# Patient Record
Sex: FEMALE | Race: BLACK OR AFRICAN AMERICAN | Employment: OTHER | URBAN - METROPOLITAN AREA
[De-identification: names, ages, dates, MRNs, and addresses within clinical notes are randomized per-mention and may not be internally consistent; named-entity substitution may affect disease eponyms.]

---

## 2017-06-20 ENCOUNTER — OFFICE VISIT (OUTPATIENT)
Dept: CARDIOLOGY CLINIC | Age: 67
End: 2017-06-20

## 2017-06-20 VITALS
BODY MASS INDEX: 20.56 KG/M2 | DIASTOLIC BLOOD PRESSURE: 72 MMHG | WEIGHT: 131 LBS | SYSTOLIC BLOOD PRESSURE: 122 MMHG | HEART RATE: 66 BPM | HEIGHT: 67 IN

## 2017-06-20 DIAGNOSIS — Z72.0 TOBACCO ABUSE: ICD-10-CM

## 2017-06-20 DIAGNOSIS — I10 ESSENTIAL HYPERTENSION: ICD-10-CM

## 2017-06-20 DIAGNOSIS — I65.23 BILATERAL CAROTID ARTERY STENOSIS: ICD-10-CM

## 2017-06-20 DIAGNOSIS — E78.2 MIXED HYPERLIPIDEMIA: ICD-10-CM

## 2017-06-20 DIAGNOSIS — I34.0 NON-RHEUMATIC MITRAL REGURGITATION: ICD-10-CM

## 2017-06-20 DIAGNOSIS — R00.1 BRADYCARDIA: Primary | ICD-10-CM

## 2017-06-20 PROCEDURE — 99214 OFFICE O/P EST MOD 30 MIN: CPT | Performed by: INTERNAL MEDICINE

## 2017-06-20 RX ORDER — AMOXICILLIN 500 MG/1
CAPSULE ORAL
Qty: 4 CAPSULE | Refills: 1 | Status: SHIPPED | OUTPATIENT
Start: 2017-06-20 | End: 2018-07-10

## 2017-07-13 ENCOUNTER — HOSPITAL ENCOUNTER (OUTPATIENT)
Dept: VASCULAR LAB | Age: 67
Discharge: HOME | End: 2017-07-13
Attending: INTERNAL MEDICINE | Admitting: INTERNAL MEDICINE

## 2017-07-13 DIAGNOSIS — I34.0 NONRHEUMATIC MITRAL VALVE INSUFFICIENCY: ICD-10-CM

## 2017-07-13 DIAGNOSIS — I65.23 BILATERAL CAROTID ARTERY STENOSIS: Primary | ICD-10-CM

## 2017-07-13 LAB
LV EF: 55 %
LVEF MODALITY: NORMAL

## 2017-11-07 ENCOUNTER — OFFICE VISIT (OUTPATIENT)
Dept: CARDIOLOGY CLINIC | Age: 67
End: 2017-11-07

## 2017-11-07 VITALS
SYSTOLIC BLOOD PRESSURE: 138 MMHG | HEART RATE: 64 BPM | DIASTOLIC BLOOD PRESSURE: 68 MMHG | WEIGHT: 131 LBS | BODY MASS INDEX: 20.52 KG/M2

## 2017-11-07 DIAGNOSIS — I34.0 NON-RHEUMATIC MITRAL REGURGITATION: ICD-10-CM

## 2017-11-07 DIAGNOSIS — E78.2 MIXED HYPERLIPIDEMIA: ICD-10-CM

## 2017-11-07 DIAGNOSIS — Z72.0 TOBACCO ABUSE: ICD-10-CM

## 2017-11-07 DIAGNOSIS — R00.1 BRADYCARDIA: Primary | ICD-10-CM

## 2017-11-07 PROCEDURE — 99214 OFFICE O/P EST MOD 30 MIN: CPT | Performed by: INTERNAL MEDICINE

## 2017-11-07 PROCEDURE — 93000 ELECTROCARDIOGRAM COMPLETE: CPT | Performed by: INTERNAL MEDICINE

## 2017-11-07 NOTE — PROGRESS NOTES
[facility]   Cardiac Evaluation      Patient: Nicholas Guardado  YOB: 1950  Date: 17       Chief Complaint   hypertension     Referring provider: Vidal Scott MD    History of Present Illness:   Ms Arleen Rockwell is seen today in follow up. She has a long standing history of hypertension, hyperlipidemia, diabetes.  Ms Arleen Rockwell thinks she had a stroke. She was evaluated in the ER and a CT of the head w/o contrast was performed with negative findings. At the time she was dizzy, had memory loss, and felt off balance. States she saw ophthalmologist because of visual field loss and was advised to see cardiologist. She smokes 3 cigarettes per day. She works as a . Collin no longer exercises regularly. Surgeries include . She has 2 children; daughter had diabetes. Her parents are ; mother had diabetes  of a stroke. Her father had AAA and  in surgery. She has a brother with diabetes; 3 siblings . She previously had problems with syncope but is better now that she is off maxide. Past Medical History:   has a past medical history of Diabetes mellitus ([facility] [address] Hyperlipidemia; and Hypertension. 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  atorvastatin (LIPITOR) 10 MG tablet Take 10 mg by mouth daily      aspirin 81 MG tablet Take 81 mg by mouth daily      metFORMIN (GLUCOPHAGE) 500 MG tablet Take 500 mg by mouth 2 times daily (with meals)       No current facility-administered medications for this visit.         Social History:  Social History     Social History    Marital status: Single, cares for boyfriend with Parkinsons     Spouse name: N/A and General Appearance:  appears stated age  Respiratory:  · Normal excursion and expansion without use of accessory muscles  · Resp Auscultation: Normal breath sounds without dullness  Cardiovascular:  · The apical impulses not displaced  · Heart is regular rate and rhythm with normal S1, S2, 3/6 holosystolic murmur at the apex  · The carotid upstroke is normal, no bruit noted   · JVP is not elevated  · Peripheral pulses are symmetrical  · There is no clubbing, cyanosis of the extremities  · No edema  · Femoral Arteries: 2+ and equal  · Pedal Pulses: 2+ and equal   Abdomen:  · No masses or tenderness  · Normal bowel sounds  Neurological/Psychiatric:  · Alert and oriented x3  · Moves all extremities well  · Exhibits normal gait balance and coordination      Assessment/Plan  1. Essential hypertension -stable, with no problems since stopping maxide  GXT 8/16: normal perfusion and HR with excercise is 141.    2 Mixed hyperlipidemia -on Lipitor 10mg daily. Stable, labs 1/17: ; TRIG 113; HDL 44; LDL 63   3 Mitral regurgitation -moderate by echo  Echo 7/20/16: Normal left ventricle size, wall thickness and systolic function with EF 60%. No regional wall motion abnormalities  Heavy mitral annular calcification is present. max mitral valve pressure gradient is 13mmHg and mean pressure gradient is 6 mmHg. mitral valve pressure half-time is calculated at 149 msec. Moderate mitral regurgitation. Mildly dilated left atrium. Aortic valve appears sclerotic but opens adequately. No stenosis. Trivial tricuspid regurgitation with RVSP 39 mmHg. Trivial pulmonic regurgitation   4. Tobacco abuse -discussed smoking cessation at length, smokes 3 cigarettes per day   5. Carotid stenosis -stable  Doppler 7/20/16: 81-70% SHRUTI, 34-99% LICA; advised to stay on lipitor. She has a request by Dr Tisha Balbuena for further testing. 6.    Bradycardia -stable    Ms Shiv Peterson is stable. I discussed smoking cessation with her at length again. Follow up 6 months. Thank you for allowing to me to participate in the care of Timmy.

## 2018-07-10 ENCOUNTER — OFFICE VISIT (OUTPATIENT)
Dept: CARDIOLOGY CLINIC | Age: 68
End: 2018-07-10

## 2018-07-10 VITALS
OXYGEN SATURATION: 96 % | BODY MASS INDEX: 21.66 KG/M2 | WEIGHT: 138 LBS | SYSTOLIC BLOOD PRESSURE: 140 MMHG | HEIGHT: 67 IN | DIASTOLIC BLOOD PRESSURE: 76 MMHG | HEART RATE: 53 BPM

## 2018-07-10 DIAGNOSIS — I34.0 MITRAL VALVE INSUFFICIENCY, UNSPECIFIED ETIOLOGY: ICD-10-CM

## 2018-07-10 DIAGNOSIS — I10 ESSENTIAL HYPERTENSION: ICD-10-CM

## 2018-07-10 DIAGNOSIS — R42 DIZZINESS: ICD-10-CM

## 2018-07-10 DIAGNOSIS — R00.1 BRADYCARDIA: ICD-10-CM

## 2018-07-10 DIAGNOSIS — E78.2 MIXED HYPERLIPIDEMIA: Primary | ICD-10-CM

## 2018-07-10 PROCEDURE — 99214 OFFICE O/P EST MOD 30 MIN: CPT | Performed by: INTERNAL MEDICINE

## 2018-07-10 RX ORDER — LOSARTAN POTASSIUM 25 MG/1
25 TABLET ORAL DAILY
Qty: 90 TABLET | Refills: 3 | Status: SHIPPED | OUTPATIENT
Start: 2018-07-10 | End: 2018-08-07

## 2018-07-10 NOTE — PROGRESS NOTES
[address]   Cardiac Evaluation      Patient: Carolina Lux  YOB: 1950  Date: 7/10/18       Chief Complaint   hypertension     Referring provider: Warren Wei MD    History of Present Illness:   Ms Keegan Gould is seen today in follow up. She has a long standing history of hypertension, hyperlipidemia, diabetes.  Ms Keegan Gould thinks she had a stroke. She was evaluated in the ER and a CT of the head w/o contrast was performed with negative findings. At the time she was dizzy, had memory loss, and felt off balance. States she saw ophthalmologist because of visual field loss and was advised to see cardiologist. She works as a . Surgeries include . She has 2 children; daughter had diabetes. Her parents are ; mother had diabetes  of a stroke. Her father had AAA and  in surgery. She has a brother with diabetes; 3 siblings . Today, Ms Keegan Gould states she has been well. She denies any chest pain, palpitations, ALANIS, dizziness, or edema. She does not exercise as a routine; states she stays active. She does feel \"off balance\" at times. She has stopped smoking 6 months ago after having a cold. Past Medical History:   has a past medical history of Diabetes mellitus ([address] Hyperlipidemia; and Hypertension. 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  atorvastatin (LIPITOR) 10 MG tablet Take 10 mg by mouth daily      aspirin 81 MG tablet Take 81 mg by mouth daily      metFORMIN (GLUCOPHAGE) 500 MG tablet Take 500 mg by mouth 2 times daily (with meals)       No current facility-administered medications for this visit.         Social History:  Social History     Social History    Marital status: Single, cares for boyfriend with Parkinsons lb (59.4 kg)      BP Readings from Last 3 Encounters:   07/10/18 (!) 140/76   11/07/17 138/68   06/20/17 122/72      Constitutional and General Appearance:  appears stated age  Respiratory:  · Normal excursion and expansion without use of accessory muscles  · Resp Auscultation: Normal breath sounds without dullness  Cardiovascular:  · The apical impulses not displaced  · Heart is regular rate and rhythm with normal S1, S2, 3/6 holosystolic murmur at the apex  · The carotid upstroke is normal, no bruit noted   · JVP is not elevated  · Peripheral pulses are symmetrical  · There is no clubbing, cyanosis of the extremities  · No edema  · Femoral Arteries: 2+ and equal  · Pedal Pulses: 2+ and equal   Abdomen:  · No masses or tenderness  · Normal bowel sounds  Neurological/Psychiatric:  · Alert and oriented x3  · Moves all extremities well  · Exhibits normal gait balance and coordination      Assessment/Plan  1. Essential hypertension -stop BB due to bradycardia, change to Losartan 25mg daily. Previously taken off Maxzide   GXT 8/16: normal perfusion and HR with excercise is 141.    2 Mixed hyperlipidemia -on Lipitor 10mg daily. Stable, labs 1/17: ; TRIG 113; HDL 44; LDL 63   3 Mitral regurgitation -moderate by echo  Echo 7/20/16: Normal left ventricle size, wall thickness and systolic function with EF 60%. No regional wall motion abnormalities  Heavy mitral annular calcification is present. max mitral valve pressure gradient is 13mmHg and mean pressure gradient is 6 mmHg. mitral valve pressure half-time is calculated at 149 msec. Moderate mitral regurgitation. Mildly dilated left atrium. Aortic valve appears sclerotic but opens adequately. No stenosis. Trivial tricuspid regurgitation with RVSP 39 mmHg. Trivial pulmonic regurgitation   4. Tobacco abuse -discussed smoking cessation at length, states she quit smoking 6 months ago   5.     Carotid stenosis -repeat  Doppler 7/17: 68-75% SHRUTI, 70-77% LICA; advised to stay on lipitor. 6.    Bradycardia -stable    Echo and carotid doppler. Change BB to Losartan 25mg daily because of bradycardia. . Return in 6 weeks to see CNP    Scribe's attestation: This note was scribed in the presence of Dr Latanya Cunha MD by Yazmin Yates RN. The scribe's documentation has been prepared under my direction and personally reviewed by me in its entirety. I confirm that the note above accurately reflects all work, treatment, procedures, and medical decision making performed by me. Thank you for allowing to me to participate in the care of Timmy.

## 2018-08-01 ENCOUNTER — HOSPITAL ENCOUNTER (OUTPATIENT)
Dept: NON INVASIVE DIAGNOSTICS | Age: 68
Discharge: HOME | End: 2018-08-01
Admitting: INTERNAL MEDICINE

## 2018-08-01 DIAGNOSIS — R42 DIZZINESS AND GIDDINESS: ICD-10-CM

## 2018-08-01 LAB
LEFT VENTRICULAR EJECTION FRACTION HIGH VALUE: 60 %
LEFT VENTRICULAR EJECTION FRACTION MODE: NORMAL
LV EF: 60 %
LVEF MODALITY: NORMAL

## 2018-08-02 ENCOUNTER — TELEPHONE (OUTPATIENT)
Dept: CARDIOLOGY CLINIC | Age: 68
End: 2018-08-02

## 2018-08-02 NOTE — TELEPHONE ENCOUNTER
----- Message from Supa Adrian MD sent at 8/2/2018  9:39 AM EDT -----  Call pt moderate disease bilat

## 2018-08-07 ENCOUNTER — OFFICE VISIT (OUTPATIENT)
Dept: CARDIOLOGY CLINIC | Age: 68
End: 2018-08-07

## 2018-08-07 VITALS
BODY MASS INDEX: 21.82 KG/M2 | WEIGHT: 139 LBS | HEART RATE: 62 BPM | HEIGHT: 67 IN | SYSTOLIC BLOOD PRESSURE: 160 MMHG | DIASTOLIC BLOOD PRESSURE: 80 MMHG

## 2018-08-07 DIAGNOSIS — I65.23 BILATERAL CAROTID ARTERY STENOSIS: ICD-10-CM

## 2018-08-07 DIAGNOSIS — I10 ESSENTIAL HYPERTENSION: ICD-10-CM

## 2018-08-07 DIAGNOSIS — E78.2 MIXED HYPERLIPIDEMIA: Primary | ICD-10-CM

## 2018-08-07 PROCEDURE — 99214 OFFICE O/P EST MOD 30 MIN: CPT | Performed by: NURSE PRACTITIONER

## 2018-08-07 RX ORDER — LOSARTAN POTASSIUM 50 MG/1
50 TABLET ORAL DAILY
Qty: 30 TABLET | Refills: 5 | Status: SHIPPED | OUTPATIENT
Start: 2018-08-07 | End: 2018-09-18 | Stop reason: SDUPTHER

## 2018-08-07 NOTE — LETTER
Site: Left Arm    Position: Sitting    Cuff Size: Medium Adult    Pulse: 62    Weight: 139 lb (63 kg)    Height: 5' 7\" (1.702 m)      Body mass index is 21.77 kg/m². Wt Readings from Last 3 Encounters:   08/06/18 139 lb (63 kg)   07/10/18 138 lb (62.6 kg)   11/07/17 131 lb (59.4 kg)      BP Readings from Last 3 Encounters:   08/07/18 (!) 160/80   07/10/18 (!) 140/76   11/07/17 138/68      Constitutional and General Appearance:  appears stated age  Respiratory:  · Normal excursion and expansion without use of accessory muscles  · Resp Auscultation: Normal breath sounds without dullness  Cardiovascular:  · The apical impulses not displaced  · Heart is regular rate and rhythm with normal S1, S2, 3/6 holosystolic murmur at the apex  · The carotid upstroke is normal, no bruit noted   · JVP is not elevated  · Peripheral pulses are symmetrical  · There is no clubbing, cyanosis of the extremities  · No edema  · Femoral Arteries: 2+ and equal  · Pedal Pulses: 2+ and equal   Abdomen:  · No masses or tenderness  · Normal bowel sounds  Neurological/Psychiatric:  · Alert and oriented x3  · Moves all extremities well  · Exhibits normal gait balance and coordination      Assessment/Plan  1. Essential hypertension -stop BB due to bradycardia,   Increase losartan to 50 mg a day  GXT 8/16: normal perfusion and HR with excercise is 141.    2 Mixed hyperlipidemia -on Lipitor 10mg daily. Stable, labs 1/17: ; TRIG 113; HDL 44; LDL 63   3 Mitral regurgitation -moderate by echo, no change on echo 7/18  Echo 7/20/16: Normal left ventricle size, wall thickness and systolic function with EF 60%. No regional wall motion abnormalities  Heavy mitral annular calcification is present. max mitral valve pressure gradient is 13mmHg and mean pressure gradient is 6 mmHg. mitral valve pressure half-time is calculated at 149 msec. Moderate mitral regurgitation. Mildly dilated left atrium. Aortic valve appears sclerotic but opens adequately.

## 2018-08-08 NOTE — COMMUNICATION BODY
age  Respiratory:  · Normal excursion and expansion without use of accessory muscles  · Resp Auscultation: Normal breath sounds without dullness  Cardiovascular:  · The apical impulses not displaced  · Heart is regular rate and rhythm with normal S1, S2, 3/6 holosystolic murmur at the apex  · The carotid upstroke is normal, no bruit noted   · JVP is not elevated  · Peripheral pulses are symmetrical  · There is no clubbing, cyanosis of the extremities  · No edema  · Femoral Arteries: 2+ and equal  · Pedal Pulses: 2+ and equal   Abdomen:  · No masses or tenderness  · Normal bowel sounds  Neurological/Psychiatric:  · Alert and oriented x3  · Moves all extremities well  · Exhibits normal gait balance and coordination      Assessment/Plan  1. Essential hypertension -stop BB due to bradycardia,   Increase losartan to 50 mg a day  GXT 8/16: normal perfusion and HR with excercise is 141.    2 Mixed hyperlipidemia -on Lipitor 10mg daily. Stable, labs 1/17: ; TRIG 113; HDL 44; LDL 63   3 Mitral regurgitation -moderate by echo, no change on echo 7/18  Echo 7/20/16: Normal left ventricle size, wall thickness and systolic function with EF 60%. No regional wall motion abnormalities  Heavy mitral annular calcification is present. max mitral valve pressure gradient is 13mmHg and mean pressure gradient is 6 mmHg. mitral valve pressure half-time is calculated at 149 msec. Moderate mitral regurgitation. Mildly dilated left atrium. Aortic valve appears sclerotic but opens adequately. No stenosis. Trivial tricuspid regurgitation with RVSP 39 mmHg. Trivial pulmonic regurgitation   4. Tobacco abuse   Not smoking now! .-discussed smoking cessation at length, states she quit smoking 6 months ago   5. Carotid stenosis -repeat  Doppler 7/17: 33-24% SHRUTI, 23-91% LICA; advised to stay on lipitor.     7/18       There is 50-69% stenosis of the bilateral internal carotid arteries.    There is more than 50 % stenosis of the right external

## 2018-09-16 LAB
ALT SERPL-CCNC: 14 U/L (ref 6–29)
AST SERPL-CCNC: 15 U/L (ref 10–35)
BUN / CREAT RATIO: ABNORMAL (CALC) (ref 6–22)
BUN BLDV-MCNC: 19 MG/DL (ref 7–25)
CALCIUM SERPL-MCNC: 9.7 MG/DL (ref 8.6–10.4)
CHLORIDE BLD-SCNC: 107 MMOL/L (ref 98–110)
CHOLESTEROL, TOTAL: 152 MG/DL
CHOLESTEROL/HDL RATIO: 2.9 (CALC)
CHOLESTEROL: 100 MG/DL (CALC)
CO2: 28 MMOL/L (ref 20–32)
CREAT SERPL-MCNC: 0.88 MG/DL (ref 0.5–0.99)
GFR AFRICAN AMERICAN: 79 ML/MIN/1.73M2
GFR SERPL CREATININE-BSD FRML MDRD: 68 ML/MIN/1.73M2
GLUCOSE BLD-MCNC: 122 MG/DL (ref 65–99)
HDLC SERPL-MCNC: 52 MG/DL
LDL CHOLESTEROL CALCULATED: 81 MG/DL (CALC)
POTASSIUM SERPL-SCNC: 5 MMOL/L (ref 3.5–5.3)
SODIUM BLD-SCNC: 146 MMOL/L (ref 135–146)
TRIGL SERPL-MCNC: 99 MG/DL

## 2018-09-18 ENCOUNTER — OFFICE VISIT (OUTPATIENT)
Dept: CARDIOLOGY CLINIC | Age: 68
End: 2018-09-18

## 2018-09-18 VITALS
SYSTOLIC BLOOD PRESSURE: 130 MMHG | HEART RATE: 64 BPM | HEIGHT: 67 IN | DIASTOLIC BLOOD PRESSURE: 82 MMHG | WEIGHT: 140.4 LBS | BODY MASS INDEX: 22.03 KG/M2

## 2018-09-18 DIAGNOSIS — I10 ESSENTIAL HYPERTENSION: ICD-10-CM

## 2018-09-18 DIAGNOSIS — Z72.0 TOBACCO ABUSE: Primary | ICD-10-CM

## 2018-09-18 DIAGNOSIS — E78.2 MIXED HYPERLIPIDEMIA: ICD-10-CM

## 2018-09-18 PROCEDURE — 99213 OFFICE O/P EST LOW 20 MIN: CPT | Performed by: NURSE PRACTITIONER

## 2018-09-18 RX ORDER — LOSARTAN POTASSIUM 50 MG/1
50 TABLET ORAL DAILY
Qty: 90 TABLET | Refills: 3 | Status: SHIPPED | OUTPATIENT
Start: 2018-09-18 | End: 2019-03-19 | Stop reason: SINTOL

## 2018-09-18 NOTE — PROGRESS NOTES
[address]   Cardiac Evaluation      Patient: Rohit Prince  YOB: 1950  Date: 9/18/18       Chief Complaint   hypertension     Referring provider: Wayne Garduno MD    History of Present Illness:   Ms Rosa Pearson is seen today in follow up. Last OV losartan was increased due to elevated  BP  And she is tolerating well. She has a long standing history of hypertension, hyperlipidemia, diabetes. January, 2016 Ms Rosa Pearson thinks she had a stroke. She was evaluated in the ER and a CT of the head w/o contrast was performed with negative findings. At the time she, had memory loss, and felt off balance. States she saw ophthalmologist because of visual field loss and was advised to see cardiologist. She works as a . Today, Ms Rosa Pearson states she has been well. She denies any chest pain, palpitations, ALANIS, dizziness, or edema. She does not exercise as a routine; states she stays active. She does feel \"off balance\" at times. She has stopped smoking 6 months ago after having a cold. She is tolerating losartan, she does not take her BP at home    Past Medical History:   has a past medical history of Diabetes mellitus ([address] Hyperlipidemia; and Hypertension. 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         Social History:  Social History     Social History    Marital status: Single, cares for boyfriend with Parkinsons     Spouse name: N/A    Number of children: N/A    Years of education: N/A     Occupational History external carotid artery .    Vertebral flow are antegrade bilaterally       6. Bradycardia -, improved now of tenormin    PLAN:  Needs BMP   No change in meds        Thank you for allowing to me to participate in the care of Timmy.

## 2018-11-12 ENCOUNTER — HOSPITAL ENCOUNTER (OUTPATIENT)
Dept: MAMMOGRAPHY | Age: 68
Discharge: HOME | End: 2018-11-17
Payer: MEDICARE

## 2018-11-12 DIAGNOSIS — Z12.31 VISIT FOR SCREENING MAMMOGRAM: ICD-10-CM

## 2018-11-12 PROCEDURE — 77067 SCR MAMMO BI INCL CAD: CPT

## 2019-03-18 ENCOUNTER — TELEPHONE (OUTPATIENT)
Dept: CARDIOLOGY CLINIC | Age: 69
End: 2019-03-18

## 2019-03-19 RX ORDER — OLMESARTAN MEDOXOMIL 5 MG/1
5 TABLET ORAL DAILY
Qty: 30 TABLET | Refills: 3 | Status: SHIPPED | OUTPATIENT
Start: 2019-03-19 | End: 2019-06-24 | Stop reason: SDUPTHER

## 2019-04-16 ENCOUNTER — HOSPITAL ENCOUNTER (OUTPATIENT)
Age: 69
Discharge: HOME | End: 2019-04-16
Payer: MEDICARE

## 2019-04-16 ENCOUNTER — OFFICE VISIT (OUTPATIENT)
Dept: CARDIOLOGY CLINIC | Age: 69
End: 2019-04-16
Payer: MEDICARE

## 2019-04-16 VITALS
BODY MASS INDEX: 21.97 KG/M2 | HEART RATE: 105 BPM | OXYGEN SATURATION: 96 % | HEIGHT: 67 IN | DIASTOLIC BLOOD PRESSURE: 86 MMHG | SYSTOLIC BLOOD PRESSURE: 138 MMHG | WEIGHT: 140 LBS

## 2019-04-16 DIAGNOSIS — R00.1 BRADYCARDIA: ICD-10-CM

## 2019-04-16 DIAGNOSIS — R29.898 WEAKNESS OF BOTH LOWER EXTREMITIES: ICD-10-CM

## 2019-04-16 DIAGNOSIS — E78.2 MIXED HYPERLIPIDEMIA: ICD-10-CM

## 2019-04-16 DIAGNOSIS — E11.9 TYPE 2 DIABETES MELLITUS WITHOUT COMPLICATION, WITHOUT LONG-TERM CURRENT USE OF INSULIN (HCC): ICD-10-CM

## 2019-04-16 DIAGNOSIS — I10 ESSENTIAL HYPERTENSION: ICD-10-CM

## 2019-04-16 DIAGNOSIS — Z72.0 TOBACCO ABUSE: ICD-10-CM

## 2019-04-16 DIAGNOSIS — I65.23 BILATERAL CAROTID ARTERY STENOSIS: ICD-10-CM

## 2019-04-16 DIAGNOSIS — I34.0 NON-RHEUMATIC MITRAL REGURGITATION: ICD-10-CM

## 2019-04-16 DIAGNOSIS — I10 ESSENTIAL HYPERTENSION: Primary | ICD-10-CM

## 2019-04-16 LAB
A/G RATIO: 1.7 (ref 1.1–2.2)
ALBUMIN SERPL-MCNC: 4.3 G/DL (ref 3.4–5)
ALP BLD-CCNC: 77 U/L (ref 40–129)
ALT SERPL-CCNC: 10 U/L (ref 10–40)
ANION GAP SERPL CALCULATED.3IONS-SCNC: 13 MMOL/L (ref 3–16)
AST SERPL-CCNC: 13 U/L (ref 15–37)
BASOPHILS ABSOLUTE: 0.1 K/UL (ref 0–0.2)
BASOPHILS RELATIVE PERCENT: 1.3 %
BILIRUB SERPL-MCNC: 0.3 MG/DL (ref 0–1)
BUN BLDV-MCNC: 19 MG/DL (ref 7–20)
CALCIUM SERPL-MCNC: 9.9 MG/DL (ref 8.3–10.6)
CHLORIDE BLD-SCNC: 105 MMOL/L (ref 99–110)
CO2: 25 MMOL/L (ref 21–32)
CREAT SERPL-MCNC: 1 MG/DL (ref 0.6–1.2)
EOSINOPHILS ABSOLUTE: 0.7 K/UL (ref 0–0.6)
EOSINOPHILS RELATIVE PERCENT: 9.8 %
FOLATE: 9.98 NG/ML (ref 4.78–24.2)
GFR AFRICAN AMERICAN: >60
GFR NON-AFRICAN AMERICAN: 55
GLOBULIN: 2.6 G/DL
GLUCOSE BLD-MCNC: 187 MG/DL (ref 70–99)
HCT VFR BLD CALC: 38.9 % (ref 36–48)
HEMOGLOBIN: 12.6 G/DL (ref 12–16)
LYMPHOCYTES ABSOLUTE: 2.3 K/UL (ref 1–5.1)
LYMPHOCYTES RELATIVE PERCENT: 33.3 %
MCH RBC QN AUTO: 31.8 PG (ref 26–34)
MCHC RBC AUTO-ENTMCNC: 32.4 G/DL (ref 31–36)
MCV RBC AUTO: 98.1 FL (ref 80–100)
MONOCYTES ABSOLUTE: 0.6 K/UL (ref 0–1.3)
MONOCYTES RELATIVE PERCENT: 8.9 %
NEUTROPHILS ABSOLUTE: 3.2 K/UL (ref 1.7–7.7)
NEUTROPHILS RELATIVE PERCENT: 46.7 %
PDW BLD-RTO: 14.4 % (ref 12.4–15.4)
PLATELET # BLD: 221 K/UL (ref 135–450)
PMV BLD AUTO: 11.1 FL (ref 5–10.5)
POTASSIUM SERPL-SCNC: 4.9 MMOL/L (ref 3.5–5.1)
RBC # BLD: 3.96 M/UL (ref 4–5.2)
SODIUM BLD-SCNC: 143 MMOL/L (ref 136–145)
T3 TOTAL: 1 NG/ML (ref 0.8–2)
T4 FREE: 1.5 NG/DL (ref 0.9–1.8)
TOTAL PROTEIN: 6.9 G/DL (ref 6.4–8.2)
TSH REFLEX: 0.02 UIU/ML (ref 0.27–4.2)
VITAMIN B-12: 402 PG/ML (ref 211–911)
WBC # BLD: 7 K/UL (ref 4–11)

## 2019-04-16 PROCEDURE — 99214 OFFICE O/P EST MOD 30 MIN: CPT | Performed by: INTERNAL MEDICINE

## 2019-04-16 PROCEDURE — 82746 ASSAY OF FOLIC ACID SERUM: CPT

## 2019-04-16 PROCEDURE — 85025 COMPLETE CBC W/AUTO DIFF WBC: CPT

## 2019-04-16 PROCEDURE — 83036 HEMOGLOBIN GLYCOSYLATED A1C: CPT

## 2019-04-16 PROCEDURE — 84439 ASSAY OF FREE THYROXINE: CPT

## 2019-04-16 PROCEDURE — 93000 ELECTROCARDIOGRAM COMPLETE: CPT | Performed by: INTERNAL MEDICINE

## 2019-04-16 PROCEDURE — 82607 VITAMIN B-12: CPT

## 2019-04-16 PROCEDURE — 84480 ASSAY TRIIODOTHYRONINE (T3): CPT

## 2019-04-16 PROCEDURE — 84443 ASSAY THYROID STIM HORMONE: CPT

## 2019-04-16 PROCEDURE — 36415 COLL VENOUS BLD VENIPUNCTURE: CPT

## 2019-04-16 PROCEDURE — 80053 COMPREHEN METABOLIC PANEL: CPT

## 2019-04-16 NOTE — PROGRESS NOTES
[facility]   Cardiac Evaluation      Patient: Chloé Montero  YOB: 1950  Date: 19       Chief Complaint   hypertension     Referring provider: Clotilde Vogel MD    History of Present Illness:   Ms Shakir Fry is seen today in follow up. She has a long standing history of hypertension, hyperlipidemia, diabetes.  Ms Shakir Fry thinks she had a stroke. She was evaluated in the ER and a CT of the head w/o contrast was performed with negative findings. At the time she was dizzy, had memory loss, and felt off balance. States she saw ophthalmologist because of visual field loss and was advised to see cardiologist. Surgeries include . She has 2 children; daughter had diabetes. Her parents are ; mother had diabetes  of a stroke. Her father had AAA and  in surgery. She has a brother with diabetes; 3 siblings . Today, Ms Shakir Fry states she has been having numbness/tingling in her mouth and weakness in her legs. She states this has been ongoing x2 months. She denies any back pain or falls but has had difficulty with balance. . She denies any chest pain, palpitations, ALANIS, dizziness, or edema. Past Medical History:   has a past medical history of Diabetes mellitus ([address] Hyperlipidemia, and Hypertension. 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         Social History:  Social History     Social History    Marital status: Single, cares for boyfriend with Parkinsons     Spouse name: N/A Encounters:   04/16/19 138/86   09/17/18 130/82   08/07/18 (!) 160/80      Constitutional and General Appearance:  appears stated age  Respiratory:  · Normal excursion and expansion without use of accessory muscles  · Resp Auscultation: Normal breath sounds without dullness  Cardiovascular:  · The apical impulses not displaced  · Heart is regular rate and rhythm with normal S1, S2, 3/6 holosystolic murmur at the apex  · The carotid upstroke is normal, no bruit noted   · JVP is not elevated  · Peripheral pulses are symmetrical  · There is no clubbing, cyanosis of the extremities  · No edema  · Femoral Arteries: 2+ and equal  · Pedal Pulses: 2+ and equal   Abdomen:  · No masses or tenderness  · Normal bowel sounds  Neurological/Psychiatric:  · Alert and oriented x3  · Moves all extremities well  · Exhibits normal gait balance and coordination      Assessment/Plan  1. Essential hypertension -stop BB due to bradycardia,  Previously taken off Maxzide   GXT 8/16: normal perfusion and HR with excercise is 141.    2 Mixed hyperlipidemia -on Lipitor 10mg daily. Stable, labs 1/17: ; TRIG 113; HDL 44; LDL 63   3 Mitral regurgitation -moderate by echo and stable. No indications for surgery at this time. Echo 8/1/18: EF 60%. No regional wall motion abnormalities are seen. Diastolic dysfunction with elevated LV filling pressures. Mitral annular calcification is present. Moderately severe mitral regurgitation is present. There is mild tricuspid regurgitation with RVSP estimated at 28 mmHg  Echo 7/20/16: Normal left ventricle size, wall thickness and systolic function with EF 60%. No regional wall motion abnormalities  Heavy mitral annular calcification is present. max mitral valve pressure gradient is 13mmHg and mean pressure gradient is 6 mmHg. mitral valve pressure half-time is calculated at 149 msec. Moderate mitral regurgitation. Mildly dilated left atrium. Aortic valve appears sclerotic but opens adequately. No stenosis. Trivial tricuspid regurgitation with RVSP 39 mmHg. Trivial pulmonic regurgitation   4. Tobacco abuse -discussed smoking cessation at length, states she quit smoking 2 years ago   5. Carotid stenosis -50-69% bilateral on carotid doppler 8/18  Doppler 7/17: 62-06% SHRUTI, 78-63% LICA; advised to stay on lipitor. 6.    Leg weakness/numbness/tingling -will refer to neurologist    EKG>sinus tachy    PLAN: labs for CBC, CMP, lipids, HGa1C, TSH, T4, B12 and folate. Refer to neuro Fu 6 months. Scribe's attestation: This note was scribed in the presence of Dr Laura Khan MD by Lanny Yin RN. The scribe's documentation has been prepared under my direction and personally reviewed by me in its entirety. I confirm that the note above accurately reflects all work, treatment, procedures, and medical decision making performed by me. Thank you for allowing to me to participate in the care of [email or web address].

## 2019-04-16 NOTE — LETTER
[address] [place] [address] Cardiology [place] [address] [address] [id number]  Phone: [phone]  Fax: [phone]    Faviola Marie MD        2019     Ninoska Del Rio, [address] [facility] [address]    Patient: Darryl Lama  MR Number: [id number]  YOB: 1950  Date of Visit: 2019    Dear Dr. Ninoska Del Rio:        [facility]   Cardiac Evaluation      Patient: Darryl Lama  YOB: 1950  Date: 19       Chief Complaint   hypertension     Referring provider: Ninoska Del Rio MD    History of Present Illness:   Ms Farnaz Rodriguez is seen today in follow up. She has a long standing history of hypertension, hyperlipidemia, diabetes.  Ms Farnaz Rodriguez thinks she had a stroke. She was evaluated in the ER and a CT of the head w/o contrast was performed with negative findings. At the time she was dizzy, had memory loss, and felt off balance. States she saw ophthalmologist because of visual field loss and was advised to see cardiologist. Surgeries include . She has 2 children; daughter had diabetes. Her parents are ; mother had diabetes  of a stroke. Her father had AAA and  in surgery. She has a brother with diabetes; 3 siblings . Today, Ms Farnaz Rodriguez states she has been having numbness/tingling in her mouth and weakness in her legs. She states this has been ongoing x2 months. She denies any back pain or falls but has had difficulty with balance. . She denies any chest pain, palpitations, ALANIS, dizziness, or edema. Past Medical History:   has a past medical history of Diabetes mellitus ([address] Hyperlipidemia, and Hypertension.  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 Social History:  Social History     Social History    Marital status: Single, cares for boyfriend with Parkinsons     Spouse name: N/A    Number of children: N/A    Years of education: N/A     Occupational History          Social History Main Topics    Smoking status: States she quit smoking 2 years ago     Packs/day: 0.50     Types: Cigarettes    Smokeless tobacco: Not on file    Alcohol use No    Drug use: No    Sexual activity: Not on file     Other Topics Concern    Not on file     Social History Narrative       Family History:  family history includes Diabetes in her mother; High Blood Pressure in her mother. Allergies:  Patient has no known allergies. Review of Systems:   · Constitutional: there has been no unanticipated weight loss. No change in energy or activity level   · Eyes: No visual changes   · ENT: No Headaches, hearing loss or vertigo. No mouth sores or sore throat. · Cardiovascular: Reviewed in HPI  · Respiratory: No cough or wheezing, no sputum production. · Gastrointestinal: No abdominal pain, appetite loss, blood in stools. No change in bowel or bladder habits. · Genitourinary: No nocturia, dysuria, trouble voiding  · Musculoskeletal:  Gait disturbance, weakness in her legs  · Integumentary: No rash or pruritis. · Neurological: No headache, change in muscle strength, numbness or tingling. No change in gait, balance, coordination, mood, affect, memory, mentation, behavior. · Psychiatric: No anxiety or depression  · Endocrine: No malaise or fever  · Hematologic/Lymphatic: No abnormal bruising or bleeding, blood clots or swollen lymph nodes. · Allergic/Immunologic: No nasal congestion or hives. Physical Examination:    Vitals:    04/16/19 1137   BP: 138/86   Site: Right Upper Arm   Position: Sitting   Cuff Size: Medium Adult   Pulse: 105   SpO2: 96%   Weight: 140 lb (63.5 kg)   Height: 5' 7\" (1.702 m)     Body mass index is 21.93 kg/m². Wt Readings from Last 3 Encounters:   04/16/19 140 lb (63.5 kg)   09/17/18 140 lb 6.4 oz (63.7 kg)   08/06/18 139 lb (63 kg)      BP Readings from Last 3 Encounters:   04/16/19 138/86   09/17/18 130/82   08/07/18 (!) 160/80      Constitutional and General Appearance:  appears stated age  Respiratory:  · Normal excursion and expansion without use of accessory muscles  · Resp Auscultation: Normal breath sounds without dullness  Cardiovascular:  · The apical impulses not displaced  · Heart is regular rate and rhythm with normal S1, S2, 3/6 holosystolic murmur at the apex  · The carotid upstroke is normal, no bruit noted   · JVP is not elevated  · Peripheral pulses are symmetrical  · There is no clubbing, cyanosis of the extremities  · No edema  · Femoral Arteries: 2+ and equal  · Pedal Pulses: 2+ and equal   Abdomen:  · No masses or tenderness  · Normal bowel sounds  Neurological/Psychiatric:  · Alert and oriented x3  · Moves all extremities well  · Exhibits normal gait balance and coordination      Assessment/Plan  1. Essential hypertension -stop BB due to bradycardia,  Previously taken off Maxzide   GXT 8/16: normal perfusion and HR with excercise is 141.    2 Mixed hyperlipidemia -on Lipitor 10mg daily. Stable, labs 1/17: ; TRIG 113; HDL 44; LDL 63   3 Mitral regurgitation -moderate by echo and stable. No indications for surgery at this time. Echo 8/1/18: EF 60%. No regional wall motion abnormalities are seen. Diastolic dysfunction with elevated LV filling pressures. Mitral annular calcification is present. Moderately severe mitral regurgitation is present. There is mild tricuspid regurgitation with RVSP estimated at 28 mmHg  Echo 7/20/16: Normal left ventricle size, wall thickness and systolic function with EF 60%. No regional wall motion abnormalities  Heavy mitral annular calcification is present. max mitral valve pressure gradient is 13mmHg and mean pressure gradient is 6 mmHg. mitral valve pressure half-time is calculated at 149 msec. Moderate mitral regurgitation. Mildly dilated left atrium. Aortic valve appears sclerotic but opens adequately. No stenosis. Trivial tricuspid regurgitation with RVSP 39 mmHg. Trivial pulmonic regurgitation   4. Tobacco abuse -discussed smoking cessation at length, states she quit smoking 2 years ago   5. Carotid stenosis -50-69% bilateral on carotid doppler 8/18  Doppler 7/17: 37-00% SHRUTI, 88-13% LICA; advised to stay on lipitor. 6.    Leg weakness/numbness/tingling -will refer to neurologist    EKG>sinus tachy    PLAN: labs for CBC, CMP, lipids, HGa1C, TSH, T4, B12 and folate. Refer to neuro Fu 6 months. Scribe's attestation: This note was scribed in the presence of Dr Mohsen Landis MD by Syl Mayers RN. The scribe's documentation has been prepared under my direction and personally reviewed by me in its entirety. I confirm that the note above accurately reflects all work, treatment, procedures, and medical decision making performed by me. Thank you for allowing to me to participate in the care of Timmy. If you have questions, please do not hesitate to call me. I look forward to following Jt Farrell along with you.     Sincerely,        Deepa Frias MD

## 2019-04-17 LAB
ESTIMATED AVERAGE GLUCOSE: 177.2 MG/DL
HBA1C MFR BLD: 7.8 %

## 2019-04-22 ENCOUNTER — TELEPHONE (OUTPATIENT)
Dept: CARDIOLOGY CLINIC | Age: 69
End: 2019-04-22

## 2019-04-22 NOTE — TELEPHONE ENCOUNTER
----- Message from Brendan Patel MD sent at 4/22/2019 11:54 AM EDT -----  Call pt a1c is still a bit high and similar to the previous one.

## 2019-06-17 ENCOUNTER — PROCEDURE VISIT (OUTPATIENT)
Dept: NEUROLOGY | Age: 69
End: 2019-06-17
Payer: MEDICARE

## 2019-06-17 DIAGNOSIS — R29.898 BILATERAL LEG WEAKNESS: Primary | ICD-10-CM

## 2019-06-17 PROCEDURE — 95886 MUSC TEST DONE W/N TEST COMP: CPT | Performed by: PSYCHIATRY & NEUROLOGY

## 2019-06-17 PROCEDURE — 95909 NRV CNDJ TST 5-6 STUDIES: CPT | Performed by: PSYCHIATRY & NEUROLOGY

## 2019-06-24 RX ORDER — OLMESARTAN MEDOXOMIL 5 MG/1
TABLET ORAL
Qty: 30 TABLET | Refills: 2 | Status: SHIPPED | OUTPATIENT
Start: 2019-06-24 | End: 2019-10-22 | Stop reason: SDUPTHER

## 2019-10-22 ENCOUNTER — OFFICE VISIT (OUTPATIENT)
Dept: CARDIOLOGY CLINIC | Age: 69
End: 2019-10-22
Payer: MEDICARE

## 2019-10-22 VITALS
RESPIRATION RATE: 18 BRPM | WEIGHT: 142 LBS | HEART RATE: 78 BPM | SYSTOLIC BLOOD PRESSURE: 169 MMHG | HEIGHT: 67 IN | BODY MASS INDEX: 22.29 KG/M2 | DIASTOLIC BLOOD PRESSURE: 72 MMHG

## 2019-10-22 DIAGNOSIS — I10 ESSENTIAL HYPERTENSION: ICD-10-CM

## 2019-10-22 DIAGNOSIS — I34.0 NON-RHEUMATIC MITRAL REGURGITATION: Primary | ICD-10-CM

## 2019-10-22 DIAGNOSIS — I65.23 BILATERAL CAROTID ARTERY STENOSIS: ICD-10-CM

## 2019-10-22 DIAGNOSIS — E78.5 HYPERLIPIDEMIA, UNSPECIFIED HYPERLIPIDEMIA TYPE: ICD-10-CM

## 2019-10-22 PROCEDURE — 99214 OFFICE O/P EST MOD 30 MIN: CPT | Performed by: INTERNAL MEDICINE

## 2019-10-22 RX ORDER — OLMESARTAN MEDOXOMIL 5 MG/1
TABLET ORAL
Qty: 90 TABLET | Refills: 3 | Status: SHIPPED | OUTPATIENT
Start: 2019-10-22 | End: 2020-09-18 | Stop reason: SDUPTHER

## 2019-10-22 RX ORDER — ATORVASTATIN CALCIUM 10 MG/1
10 TABLET, FILM COATED ORAL DAILY
Qty: 90 TABLET | Refills: 3 | Status: SHIPPED | OUTPATIENT
Start: 2019-10-22 | End: 2020-10-28

## 2019-11-15 ENCOUNTER — HOSPITAL ENCOUNTER (OUTPATIENT)
Dept: MAMMOGRAPHY | Age: 69
Discharge: HOME | End: 2019-11-20
Payer: MEDICARE

## 2019-11-15 DIAGNOSIS — Z12.31 VISIT FOR SCREENING MAMMOGRAM: ICD-10-CM

## 2019-11-15 PROCEDURE — 77063 BREAST TOMOSYNTHESIS BI: CPT

## 2019-11-18 ENCOUNTER — HOSPITAL ENCOUNTER (OUTPATIENT)
Dept: NON INVASIVE DIAGNOSTICS | Age: 69
Discharge: HOME | End: 2019-11-18
Payer: MEDICARE

## 2019-11-18 DIAGNOSIS — I10 ESSENTIAL HYPERTENSION: ICD-10-CM

## 2019-11-18 DIAGNOSIS — I34.0 NON-RHEUMATIC MITRAL REGURGITATION: ICD-10-CM

## 2019-11-18 PROCEDURE — 93306 TTE W/DOPPLER COMPLETE: CPT

## 2019-11-20 ENCOUNTER — TELEPHONE (OUTPATIENT)
Dept: CARDIOLOGY CLINIC | Age: 69
End: 2019-11-20

## 2020-09-18 ENCOUNTER — OFFICE VISIT (OUTPATIENT)
Dept: CARDIOLOGY CLINIC | Age: 70
End: 2020-09-18
Payer: MEDICARE

## 2020-09-18 ENCOUNTER — HOSPITAL ENCOUNTER (OUTPATIENT)
Age: 70
Discharge: HOME | End: 2020-09-18
Payer: MEDICARE

## 2020-09-18 VITALS
HEART RATE: 100 BPM | DIASTOLIC BLOOD PRESSURE: 70 MMHG | OXYGEN SATURATION: 97 % | WEIGHT: 138 LBS | SYSTOLIC BLOOD PRESSURE: 128 MMHG | BODY MASS INDEX: 20.92 KG/M2 | HEIGHT: 68 IN

## 2020-09-18 PROBLEM — R29.898 LEG WEAKNESS: Status: ACTIVE | Noted: 2020-09-18

## 2020-09-18 PROBLEM — R42 DIZZINESS: Status: ACTIVE | Noted: 2020-09-18

## 2020-09-18 LAB
A/G RATIO: 1.7 (ref 1.1–2.2)
ALBUMIN SERPL-MCNC: 4.1 G/DL (ref 3.4–5)
ALP BLD-CCNC: 75 U/L (ref 40–129)
ALT SERPL-CCNC: 9 U/L (ref 10–40)
ANION GAP SERPL CALCULATED.3IONS-SCNC: 10 MMOL/L (ref 3–16)
AST SERPL-CCNC: 14 U/L (ref 15–37)
BILIRUB SERPL-MCNC: 0.3 MG/DL (ref 0–1)
BUN BLDV-MCNC: 17 MG/DL (ref 7–20)
CALCIUM SERPL-MCNC: 9.7 MG/DL (ref 8.3–10.6)
CHLORIDE BLD-SCNC: 103 MMOL/L (ref 99–110)
CHOLESTEROL, TOTAL: 139 MG/DL (ref 0–199)
CO2: 26 MMOL/L (ref 21–32)
CREAT SERPL-MCNC: 0.8 MG/DL (ref 0.6–1.2)
GFR AFRICAN AMERICAN: >60
GFR NON-AFRICAN AMERICAN: >60
GLOBULIN: 2.4 G/DL
GLUCOSE BLD-MCNC: 273 MG/DL (ref 70–99)
HDLC SERPL-MCNC: 48 MG/DL (ref 40–60)
LDL CHOLESTEROL CALCULATED: 65 MG/DL
POTASSIUM SERPL-SCNC: 5.3 MMOL/L (ref 3.5–5.1)
SODIUM BLD-SCNC: 139 MMOL/L (ref 136–145)
TOTAL PROTEIN: 6.5 G/DL (ref 6.4–8.2)
TRIGL SERPL-MCNC: 130 MG/DL (ref 0–150)
VLDLC SERPL CALC-MCNC: 26 MG/DL

## 2020-09-18 PROCEDURE — 36415 COLL VENOUS BLD VENIPUNCTURE: CPT

## 2020-09-18 PROCEDURE — 93000 ELECTROCARDIOGRAM COMPLETE: CPT | Performed by: NURSE PRACTITIONER

## 2020-09-18 PROCEDURE — 99214 OFFICE O/P EST MOD 30 MIN: CPT | Performed by: NURSE PRACTITIONER

## 2020-09-18 PROCEDURE — 80053 COMPREHEN METABOLIC PANEL: CPT

## 2020-09-18 PROCEDURE — 80061 LIPID PANEL: CPT

## 2020-09-18 RX ORDER — OLMESARTAN MEDOXOMIL 5 MG/1
TABLET ORAL
Qty: 90 TABLET | Refills: 3 | Status: SHIPPED | OUTPATIENT
Start: 2020-09-18 | End: 2021-10-13

## 2020-09-18 NOTE — PROGRESS NOTES
[address]   Cardiac Evaluation      Patient: Tanya Dave  YOB: 1950  Date: 20     Chief Complaint   Patient presents with    Hypertension     light headed     Follow-up     1 yr      Referring provider: Verner Austria, MD    History of Present Illness:   Ms Lydia Beauchamp has a long standing history of hypertension, hyperlipidemia, diabetes.  Ms Lydia Beauchamp thought she had a stroke. She was evaluated in the ER and a CT of the head w/o contrast was performed with negative findings. At the time she was dizzy, had memory loss, and felt off balance. She stated she saw ophthalmologist because of visual field loss and was advised to see cardiologist. Surgeries include . She has 2 children; daughter had diabetes. Her parents are ; mother had diabetes  of a stroke. Her father had AAA and  in surgery. She has a brother with diabetes; 3 siblings . Today, Ms. Lydia Beauchamp is here for a follow up and states she has been feeling pretty good. She denies chest pain, shortness of breath, palpitations, or swelling. She states sometimes she feels dizzy when she is getting out of bed in the morning. When that happens she sits on the side of bed a little longer to get her bearings. She states she hasn't been too active during the pandemic. Past Medical History:   has a past medical history of Diabetes mellitus ([address] Hyperlipidemia, and Hypertension.  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)    Surgical History:   C section     Current Outpatient Medications   Medication Sig Dispense Refill    olmesartan (BENICAR) 5 MG tablet TAKE ONE TABLET BY MOUTH DAILY 90 tablet 3    atorvastatin (LIPITOR) 10 MG tablet Take 1 tablet by mouth daily 90 tablet 3    aspirin 81 MG tablet Take 81 mg by mouth daily      metFORMIN (GLUCOPHAGE) 500 MG tablet Take 1,000 mg by mouth 2 times daily (with meals)        No current facility-administered medications for Readings from Last 3 Encounters:   09/18/20 138 lb (62.6 kg)   10/22/19 142 lb (64.4 kg)   04/16/19 140 lb (63.5 kg)      BP Readings from Last 3 Encounters:   09/18/20 128/70   10/22/19 (!) 169/72   04/16/19 138/86      Constitutional and General Appearance:  appears stated age  Respiratory:  · Normal excursion and expansion without use of accessory muscles  · Resp Auscultation: Normal breath sounds without dullness  Cardiovascular:  · The apical impulses not displaced  · Heart is regular rate and rhythm with normal S1, S2, 3/6 holosystolic murmur at the apex  · The carotid upstroke is normal, no bruit noted   · JVP is not elevated  · Peripheral pulses are symmetrical  · There is no clubbing, cyanosis of the extremities  · No edema  · Femoral Arteries: 2+ and equal  · Pedal Pulses: 2+ and equal   Abdomen:  · No masses or tenderness  · Normal bowel sounds  Neurological/Psychiatric:  · Alert and oriented x3  · Moves all extremities well  · Exhibits normal gait balance and coordination    Assessment:  1. Mitral regurgitation: Moderate by echo and stable. No indications for surgery at this time.   -Echo 11/18/19>EF 60%, mild cLVH. No regional wall motion abnormalities. Indeterminate DD. Mitral annular calcification is present. There is at least moderate mitral regurgitation. The left atrium is mildly dilated. Mild tricuspid regurgitation.    -Echo 8/1/18> EF 60%. No regional wall motion abnormalities are seen. Diastolic dysfunction with elevated LV filling pressures. Mitral annular calcification is present. Moderately severe mitral regurgitation is present. There is mild tricuspid regurgitation with RVSP estimated at 28 mmHg  -Echo 7/20/16> Normal left ventricle size, wall thickness and systolic function with EF 60%. No regional wall motion abnormalities  Heavy mitral annular calcification is present. max mitral valve pressure gradient is 13mmHg and mean pressure gradient is 6 mmHg.   mitral valve pressure half-time is calculated at 149 msec. Moderate mitral regurgitation. Mildly dilated left atrium. Aortic valve appears sclerotic but opens adequately. No stenosis. Trivial tricuspid regurgitation with RVSP 39 mmHg. Trivial pulmonic regurgitation   2 Essential hypertension: Elevated but ran out of meds recently -- will refill. Blood pressure 128/70 pulse 100, resp. rate 18, height 5' 7\" (1.702 m), weight 138 lb   Off BB due to bradycardia,  Previously taken off Maxzide. -GXT eric 8/16> normal perfusion and HR with excercise is 141.   3 Mixed hyperlipidemia: Stable on Lipitor 10mg. 9/15/18> , TG 99, HDL 52, LDL 81.    4. Tobacco abuse: Discussed smoking cessation at length, states she quit smoking 2+ years ago   5. Carotid stenosis: Stable  Dopplers 8/1/18> 50-69% bilateral  Doppler 7/2017> 26-45% SHRUTI, 41-90% LICA; advised to stay on Lipitor. 6.      Leg weakness/numbness/tingling, h/o: She was referred to neurologist at last visit and saw Dr. Cordelia Schwarz earlier this summer 2019. She remains \"off balance\" at times but EMG in June 2019 was normal.  7.      Dizziness> orthostatics negative     EKG 9/18/20 > Sinus Rhythm, left atrial enlargement    Plan: Get blood work done today. Schedule echocardiogram and carotid ultrasound. RTO in 6 months and establish care with Dr. Chepe Mullins as patient does not wish to travel to [facility] for appointments. Thank you for allowing to me to participate in the care of Timmy.

## 2020-09-22 ENCOUNTER — TELEPHONE (OUTPATIENT)
Dept: CARDIOLOGY CLINIC | Age: 70
End: 2020-09-22

## 2020-09-22 NOTE — TELEPHONE ENCOUNTER
----- Message from BRAYDEN Kat CNP sent at 9/22/2020 11:40 AM EDT -----  [facility] asking patient to call back to go over lab result. Potassium level high at 5.3. Recommend repeating BMP this Friday to make sure it is back down to normal levels. Glucose is also high. Recommend she follow up with PCP for glycemic control. Cholesterol panel looks good.

## 2020-09-25 ENCOUNTER — HOSPITAL ENCOUNTER (OUTPATIENT)
Age: 70
Discharge: HOME | End: 2020-09-25
Payer: MEDICARE

## 2020-09-25 LAB
ANION GAP SERPL CALCULATED.3IONS-SCNC: 12 MMOL/L (ref 3–16)
BUN BLDV-MCNC: 16 MG/DL (ref 7–20)
CALCIUM SERPL-MCNC: 9.7 MG/DL (ref 8.3–10.6)
CHLORIDE BLD-SCNC: 105 MMOL/L (ref 99–110)
CO2: 23 MMOL/L (ref 21–32)
CREAT SERPL-MCNC: 0.8 MG/DL (ref 0.6–1.2)
GFR AFRICAN AMERICAN: >60
GFR NON-AFRICAN AMERICAN: >60
GLUCOSE BLD-MCNC: 244 MG/DL (ref 70–99)
POTASSIUM SERPL-SCNC: 4.9 MMOL/L (ref 3.5–5.1)
SODIUM BLD-SCNC: 140 MMOL/L (ref 136–145)

## 2020-09-25 PROCEDURE — 80048 BASIC METABOLIC PNL TOTAL CA: CPT

## 2020-09-25 PROCEDURE — 36415 COLL VENOUS BLD VENIPUNCTURE: CPT

## 2020-09-29 ENCOUNTER — TELEPHONE (OUTPATIENT)
Dept: CARDIOLOGY CLINIC | Age: 70
End: 2020-09-29

## 2020-09-29 NOTE — TELEPHONE ENCOUNTER
----- Message from BRAYDEN Horton CNP sent at 9/29/2020  8:10 AM EDT -----  Potassium level back to normal. Glucose still high at 244. Recommend following up with PCP for glucose control.

## 2020-10-28 RX ORDER — ATORVASTATIN CALCIUM 10 MG/1
TABLET, FILM COATED ORAL
Qty: 90 TABLET | Refills: 2 | Status: SHIPPED | OUTPATIENT
Start: 2020-10-28 | End: 2021-01-08

## 2020-10-28 NOTE — TELEPHONE ENCOUNTER
Refill was requested from pharmacy. Patient is up to date with appointments and lab work.      Last OV 9/18/2020  Last labs done 9/18/2020

## 2020-11-20 ENCOUNTER — HOSPITAL ENCOUNTER (OUTPATIENT)
Dept: NON INVASIVE DIAGNOSTICS | Age: 70
Discharge: HOME | End: 2020-11-20
Payer: MEDICARE

## 2020-11-20 ENCOUNTER — HOSPITAL ENCOUNTER (OUTPATIENT)
Dept: VASCULAR LAB | Age: 70
Discharge: HOME | End: 2020-11-20
Payer: MEDICARE

## 2020-11-20 LAB
LV EF: 63 %
LVEF MODALITY: NORMAL

## 2020-11-20 PROCEDURE — 93306 TTE W/DOPPLER COMPLETE: CPT

## 2020-11-20 PROCEDURE — 93880 EXTRACRANIAL BILAT STUDY: CPT

## 2020-11-23 ENCOUNTER — TELEPHONE (OUTPATIENT)
Dept: CARDIOLOGY CLINIC | Age: 70
End: 2020-11-23

## 2020-11-24 ENCOUNTER — TELEPHONE (OUTPATIENT)
Dept: CARDIOLOGY CLINIC | Age: 70
End: 2020-11-24

## 2020-11-24 NOTE — TELEPHONE ENCOUNTER
Kiki Reyez, APRN - CNP  [facility] Staff               No real change in carotid stenosis since 2018. Continue with aspirin and statin and we will recheck in a year. Called patient lmom with carotid results.

## 2020-12-07 ENCOUNTER — TELEPHONE (OUTPATIENT)
Dept: CARDIOLOGY CLINIC | Age: 70
End: 2020-12-07

## 2020-12-29 NOTE — ASSESSMENT & PLAN NOTE
Carotid dopplers  There is <50% stenosis of the right internal carotid arteries. There is 50-69% stenosis of the left internal carotid arteries.    Plan~ continue asa/lipitor; will re check 1 year

## 2020-12-29 NOTE — PATIENT INSTRUCTIONS
Schedule a DORINA to evaluate your mitral regurgitation  Follow up 2 months to discuss plan regarding surgery  Take time to discuss with your family and call the office with any questions or concerns.

## 2020-12-29 NOTE — ASSESSMENT & PLAN NOTE
Per echo 11/20/20 severe mitral regurgitation (moderate in 2019)  Discussed at length with patient. Recommend DORINA and surgery to repair the mitral valve. She would like to take time to discuss with her family and process the info. She prefers to wait before scheduling the DORINA. She will follow up in 2 months to re visit plans.

## 2020-12-29 NOTE — PROGRESS NOTES
[address]   Cardiac Evaluation      Patient: Elfego Lazo  YOB: 1950         Chief Complaint   Patient presents with    Coronary Artery Disease    Hypertension    Hyperlipidemia        Referring provider: Charito Banks MD    History of Present Illness:  Ms. Abel Topete is a 72 y.o. female here for management of [address], [address], DM. She also has moderate Mitral regurg by echo . She has seen Stefano Schwartz in the past, however, does not wish to go to [facility] for appointments. She was recently seen by Valerie Lr NP and was feeling good at that time but did mention some dizzy episodes when getting out of bed in the mornings (orthostatics neg). In  she thought she had a stroke,  she was evaluated in the ER and a CT of the head w/o contrast was performed with negative findings. At the time she was dizzy, had memory loss, and felt off balance. She stated she saw ophthalmologist because of visual field loss and was advised to see cardiologist. Her parents are ; mother had diabetes  of a stroke. Her father had AAA and  in surgery. She has a brother with diabetes; 3 siblings . She quit smoking 4 years ago. Today she mentions that she passed out at [facility] in December. She feels that her balance is sometimes off, and in the mornings she feels dizzy when she first gets up. She denies chest pain or pressure and SOB. She has not felt any palpitations. We discussed a recommended plan for treatment of Severe Mitral Regurgitation, Ms. Abel Topete is anxious and would like to take time to process the information and discuss with her family. With regard to medication therapy he/she has been compliant with prescribed regimen and has tolerated therapy to date. Past Medical History:   has a past medical history of Diabetes mellitus ([address] Hyperlipidemia, and Hypertension. Surgical History:   has no past surgical history on file.      Current Outpatient Medications Medication Sig Dispense Refill    atorvastatin (LIPITOR) 40 MG tablet Take 40 mg by mouth daily      olmesartan (BENICAR) 5 MG tablet TAKE ONE TABLET BY MOUTH DAILY 90 tablet 3    aspirin 81 MG tablet Take 81 mg by mouth daily      metFORMIN (GLUCOPHAGE) 500 MG tablet Take 1,000 mg by mouth 2 times daily (with meals)        No current facility-administered medications for this visit. Allergies:  Patient has no known allergies.      Social History:  Social History     Socioeconomic History    Marital status: Single     Spouse name: Not on file    Number of children: Not on file    Years of education: Not on file    Highest education level: Not on file   Occupational History    Not on file   Social Needs    Financial resource strain: Not on file    Food insecurity     Worry: Not on file     Inability: Not on file    Transportation needs     Medical: Not on file     Non-medical: Not on file   Tobacco Use    Smoking status: Current Every Day Smoker     Packs/day: 0.50     Types: Cigarettes    Smokeless tobacco: Never Used   Substance and Sexual Activity    Alcohol use: No    Drug use: No    Sexual activity: Not on file   Lifestyle    Physical activity     Days per week: Not on file     Minutes per session: Not on file    Stress: Not on file   Relationships    Social connections     Talks on phone: Not on file     Gets together: Not on file     Attends Sabianist service: Not on file     Active member of club or organization: Not on file     Attends meetings of clubs or organizations: Not on file     Relationship status: Not on file    Intimate partner violence     Fear of current or ex partner: Not on file     Emotionally abused: Not on file     Physically abused: Not on file     Forced sexual activity: Not on file   Other Topics Concern    Not on file   Social History Narrative    Not on file       Family History:   Family History   Problem Relation Age of Onset    Diabetes Mother    [facility] effort, no rales or rhonchi  · CARDIOVASCULAR: Normal PMI, regular rate and rhythm, apical holosystolic ejection murmurs, rub or gallop. No edema. Radial pulses present and equal  · CHEST: No scar or masses  · ABDOMEN: Normal bowel sounds. No masses or tenderness. No bruit  · MUSCULOSKELETAL: No clubbing or cyanosis. Moves all extremities well. Normal gait  · SKIN:  Warm and dry. No rashes  · NEUROLOGIC: Cranial nerves intact. Alert and oriented  · PSYCHIATRIC: Calm affect. Appears to have normal judgement and insight    All testing and labs listed below were personally reviewed by myself. Echo 11/20/20  Summary  Left ventricular systolic function is normal with ejection fraction estimated at 60-65 %. No regional wall motion abnormalities are noted. Normal left ventricular wall thickness. Grade II diastolic dysfunction with elevated filling pressure. The left atrium is severely dilated. Mitral annular calcification is present. Limited motion of the posterior leaflet. Severe mitral regurgitation. Mild mitral stenosis is present. Mild pulmonic regurgitation present. Mild-to-moderate tricuspid regurgitation. Systolic pulmonary artery pressure (SPAP) is normal and estimated at 32 mmHg  (right atrial pressure 3 mmHg). IVC is normal in size (< 2.1 cm) and collapses > 50% with respiration consistent with normal RA pressure (3 mmHg). Previous echo done 11/2019 - EF 60%    Carotid dopplers 11/20/20   There is <50% stenosis of the right internal carotid arteries. There is 50-69% stenosis of the left internal carotid arteries. Assessment/Plan  1. Non-rheumatic mitral regurgitation    2. Essential hypertension    3. Mixed hyperlipidemia    4. Bilateral carotid artery stenosis    5. Severe mitral regurgitation          Non-rheumatic mitral regurgitation  Per echo 11/20/20 severe mitral regurgitation (moderate in 2019)  Discussed at length with patient. Recommend DORINA and surgery to repair the mitral valve. She would like to take time to discuss with her family and process the info. She prefers to wait before scheduling the DORINA. She will follow up in 2 months to re visit plans. Essential hypertension  Meds~ Benicar  Plan~ stable, no med changes    Mixed hyperlipidemia  LDL~ 65  Date of last lipid panel~ 9/2020  Meds~ Lipitor  Plan~ stable on current plan    Bilateral carotid artery stenosis  Carotid dopplers  There is <50% stenosis of the right internal carotid arteries. There is 50-69% stenosis of the left internal carotid arteries. Plan~ continue asa/lipitor; will re check 1 year      Orders Placed This Encounter   Procedures    Echo transesophageal (DORINA)       Leida Gonzalez MD      Thank you for allowing to me to participate in the care of [facility] CHELSEY Scribe's Attestation: This note was scribed in the presence of Dr. Nevin Moran MD by Rakesh Hansen RN.    I, Dr. Nevin Moran, personally performed the services described in this documentation, as scribed by the above signed scribe in my presence. It is both accurate and complete to my knowledge. I agree with the details independently gathered by the clinical support staff, while the remaining scribed note accurately describes my personal service to the patient.

## 2020-12-30 ENCOUNTER — HOSPITAL ENCOUNTER (OUTPATIENT)
Dept: MAMMOGRAPHY | Age: 70
Discharge: HOME | End: 2021-01-04
Payer: MEDICARE

## 2020-12-30 DIAGNOSIS — Z12.31 VISIT FOR SCREENING MAMMOGRAM: ICD-10-CM

## 2020-12-30 PROCEDURE — 77063 BREAST TOMOSYNTHESIS BI: CPT

## 2021-01-08 ENCOUNTER — OFFICE VISIT (OUTPATIENT)
Dept: CARDIOLOGY CLINIC | Age: 71
End: 2021-01-08
Payer: MEDICARE

## 2021-01-08 VITALS
HEART RATE: 84 BPM | WEIGHT: 135 LBS | HEIGHT: 67 IN | DIASTOLIC BLOOD PRESSURE: 76 MMHG | BODY MASS INDEX: 21.19 KG/M2 | SYSTOLIC BLOOD PRESSURE: 154 MMHG | OXYGEN SATURATION: 96 %

## 2021-01-08 DIAGNOSIS — I65.23 BILATERAL CAROTID ARTERY STENOSIS: ICD-10-CM

## 2021-01-08 DIAGNOSIS — I10 ESSENTIAL HYPERTENSION: ICD-10-CM

## 2021-01-08 DIAGNOSIS — E78.2 MIXED HYPERLIPIDEMIA: ICD-10-CM

## 2021-01-08 DIAGNOSIS — I34.0 NON-RHEUMATIC MITRAL REGURGITATION: Primary | ICD-10-CM

## 2021-01-08 DIAGNOSIS — I34.0 SEVERE MITRAL REGURGITATION: ICD-10-CM

## 2021-01-08 PROCEDURE — 99214 OFFICE O/P EST MOD 30 MIN: CPT | Performed by: INTERNAL MEDICINE

## 2021-01-08 RX ORDER — ATORVASTATIN CALCIUM 40 MG/1
40 TABLET, FILM COATED ORAL DAILY
COMMUNITY

## 2021-01-29 NOTE — TELEPHONE ENCOUNTER
Problem: Potential for Falls  Goal: Patient will remain free of falls  Description: INTERVENTIONS:  - Assess patient frequently for physical needs  -  Identify cognitive and physical deficits and behaviors that affect risk of falls    -  [place] fall precautions as indicated by assessment   - Educate patient/family on patient safety including physical limitations  - Instruct patient to call for assistance with activity based on assessment  - Modify environment to reduce risk of injury  - Consider OT/PT consult to assist with strengthening/mobility  1/29/2021 0938 by Misha Varela RN  Outcome: Progressing  1/29/2021 0836 by Misha Varela RN  Outcome: Progressing Spoke to the pt-gave lab results, and instructions per [employer], CNP. Has an appointment with the PCP next month.

## 2021-03-03 NOTE — ASSESSMENT & PLAN NOTE
Carotid dopplers  There is <50% stenosis of the right internal carotid arteries. There is 50-69% stenosis of the left internal carotid arteries.    Plan~ continue asa/lipitor; will re check in December

## 2021-03-03 NOTE — ASSESSMENT & PLAN NOTE
LDL~ 65  Date of last lipid panel~ 9/2020  Meds~ Lipitor  Plan~ stable on current plan; recheck yearly

## 2021-03-03 NOTE — PROGRESS NOTES
[facility]   Cardiac Evaluation      Patient: Ulices Weston  YOB: 1950         Chief Complaint   Patient presents with    Hypertension     Patient return to office for her 2 month follow up     Hyperlipidemia        Referring provider: Mikayla Kim MD    History of Present Illness:  Ms. Dannielle Mosley is a 72 y.o. female here for management of [address], [address], DM. She also has moderate Mitral regurg by echo . She has seen Monae Skinner in the past, however, does not wish to go to [facility] for appointments. She was recently seen by Ivy Taylor NP and was feeling good at that time but did mention some dizzy episodes when getting out of bed in the mornings (orthostatics neg). In  she thought she had a stroke,  she was evaluated in the ER and a CT of the head w/o contrast was performed with negative findings. At the time she was dizzy, had memory loss, and felt off balance. She stated she saw ophthalmologist because of visual field loss and was advised to see cardiologist. Her parents are ; mother had diabetes  of a stroke. Her father had AAA and  in surgery. She has a brother with diabetes; 3 siblings . She quit smoking 4 years ago. She passed out at [facility] in December. She feels that her balance is sometimes off, and in the mornings she feels dizzy when she first gets up. She denies chest pain or pressure and SOB. She has not felt any palpitations. We discussed a recommended plan for treatment of Severe Mitral Regurgitation, Ms. Dannielle Mosley is anxious and needed to discuss with family. Today she reports feeling good overall. She states that she has trouble with walking d/t leg pain, ankle arthritis. She denies SOB and Chest pain/pressure. She states she does not want surgery and is asking about a mitral clip. With regard to medication therapy he/she has been compliant with prescribed regimen and has tolerated therapy to date.     Past Medical History: has a past medical history of Diabetes mellitus ([facility] [address] Hyperlipidemia, and Hypertension. Surgical History:   has no past surgical history on file. Current Outpatient Medications   Medication Sig Dispense Refill    atorvastatin (LIPITOR) 40 MG tablet Take 40 mg by mouth daily      olmesartan (BENICAR) 5 MG tablet TAKE ONE TABLET BY MOUTH DAILY 90 tablet 3    aspirin 81 MG tablet Take 81 mg by mouth daily      metFORMIN (GLUCOPHAGE) 500 MG tablet Take 1,000 mg by mouth 2 times daily (with meals)        No current facility-administered medications for this visit. Allergies:  Patient has no known allergies.      Social History:  Social History     Socioeconomic History    Marital status: Single     Spouse name: Not on file    Number of children: Not on file    Years of education: Not on file    Highest education level: Not on file   Occupational History    Not on file   Social Needs    Financial resource strain: Not on file    Food insecurity     Worry: Not on file     Inability: Not on file    Transportation needs     Medical: Not on file     Non-medical: Not on file   Tobacco Use    Smoking status: Former Smoker     Packs/day: 0.50     Years: 30.00     Pack years: 15.00     Types: Cigarettes     Quit date: 3/1/2016     Years since quittin.0    Smokeless tobacco: Never Used   Substance and Sexual Activity    Alcohol use: No    Drug use: No    Sexual activity: Not on file   Lifestyle    Physical activity     Days per week: Not on file     Minutes per session: Not on file    Stress: Not on file   Relationships    Social connections     Talks on phone: Not on file     Gets together: Not on file     Attends Religion service: Not on file     Active member of club or organization: Not on file     Attends meetings of clubs or organizations: Not on file     Relationship status: Not on file    Intimate partner violence     Fear of current or ex partner: Not on file     Emotionally abused: Not on file     Physically abused: Not on file     Forced sexual activity: Not on file   Other Topics Concern    Not on file   Social History Narrative    Not on file       Family History:   Family History   Problem Relation Age of Onset    Diabetes Mother     High Blood Pressure Mother      Family history has been reviewed and not pertinent except as noted above. Review of Systems:   · Constitutional: there has been no unanticipated weight loss. No change in energy or activity level   · Eyes: No visual changes   · ENT: No Headaches, hearing loss or vertigo. No mouth sores or sore throat. · Cardiovascular: Reviewed in HPI  · Respiratory: No cough or wheezing, no sputum production. · Gastrointestinal: No abdominal pain, appetite loss, blood in stools. No change in bowel or bladder habits. · Genitourinary: No nocturia, dysuria, trouble voiding  · Musculoskeletal:  No gait disturbance, weakness or joint complaints. · Integumentary: No rash or pruritis. · Neurological: No headache, change in muscle strength, numbness or tingling. No change in gait, balance, coordination, mood, affect, memory, mentation, behavior. · Psychiatric: No anxiety or depression  · Endocrine: No malaise or fever  · Hematologic/Lymphatic: No abnormal bruising or bleeding, blood clots or swollen lymph nodes. · Allergic/Immunologic: No nasal congestion or hives. Physical Examination:    Vitals:    03/09/21 1311   BP: 120/64   Site: Left Upper Arm   Position: Sitting   Cuff Size: Medium Adult   Pulse: 100   SpO2: 97%   Weight: 136 lb (61.7 kg)   Height: 5' 7\" (1.702 m)     Body mass index is 21.3 kg/m². Wt Readings from Last 3 Encounters:   03/09/21 136 lb (61.7 kg)   01/08/21 135 lb (61.2 kg)   09/18/20 138 lb (62.6 kg)      BP Readings from Last 3 Encounters:   03/09/21 120/64   01/08/21 (!) 154/76   09/18/20 128/70        Physical Examination:    · CONSTITUTIONAL: Well developed, well nourished  · EYES: PERRLA.  No xanthelasma, sclera non icteric  · EARS,NOSE,MOUTH,THROAT:  Mucous membranes moist, normal hearing  · NECK: Supple, JVP normal, thyroid not enlarged. Carotids 2+ without bruits  · RESPIRATORY: Normal effort, no rales or rhonchi  · CARDIOVASCULAR: Normal PMI, regular rate and rhythm, apical holosystolic ejection murmurs, rub or gallop. No edema. Radial pulses present and equal  · CHEST: No scar or masses  · ABDOMEN: Normal bowel sounds. No masses or tenderness. No bruit  · MUSCULOSKELETAL: No clubbing or cyanosis. Moves all extremities well. Normal gait  · SKIN:  Warm and dry. No rashes  · NEUROLOGIC: Cranial nerves intact. Alert and oriented  · PSYCHIATRIC: Calm affect. Appears to have normal judgement and insight    All testing and labs listed below were personally reviewed by myself. Echo 11/20/20  Summary  Left ventricular systolic function is normal with ejection fraction estimated at 60-65 %. No regional wall motion abnormalities are noted. Normal left ventricular wall thickness. Grade II diastolic dysfunction with elevated filling pressure. The left atrium is severely dilated. Mitral annular calcification is present. Limited motion of the posterior leaflet. Severe mitral regurgitation. Mild mitral stenosis is present. Mild pulmonic regurgitation present. Mild-to-moderate tricuspid regurgitation. Systolic pulmonary artery pressure (SPAP) is normal and estimated at 32 mmHg  (right atrial pressure 3 mmHg). IVC is normal in size (< 2.1 cm) and collapses > 50% with respiration consistent with normal RA pressure (3 mmHg). Previous echo done 11/2019 - EF 60%    Carotid dopplers 11/20/20   There is <50% stenosis of the right internal carotid arteries. There is 50-69% stenosis of the left internal carotid arteries. Assessment/Plan  1. Non-rheumatic mitral regurgitation    2. Mixed hyperlipidemia    3. Essential hypertension    4.  Bilateral carotid artery stenosis          Bilateral carotid artery stenosis  Carotid dopplers  There is <50% stenosis of the right internal carotid arteries. There is 50-69% stenosis of the left internal carotid arteries. Plan~ continue asa/lipitor; will re check in December    Essential hypertension  Meds~ Benicar  Plan~ stable, no med changes    Mixed hyperlipidemia  LDL~ 65  Date of last lipid panel~ 9/2020  Meds~ Lipitor  Plan~ stable on current plan; recheck yearly    Non-rheumatic mitral regurgitation  Per echo 11/20/20 severe mitral regurgitation (moderate in 2019)  Discussed at length with patient. Recommend DORINA and surgery to repair the mitral valve. Plan~ Ms Umberto Van does not wish to have open heart, discussion at length regarding risks, benefits regarding surgery. She is not candidate for mitral clip. She is aware of the options. She was instructed to call or let us know if she would like to move forward. Follow up 12 months      Orders Placed This Encounter   Procedures    VL DUP CAROTID BILATERAL    Echo 2D w doppler w color complete       Rafael Cabrera MD      Thank you for allowing to me to participate in the care of [facility] AND [facility]. Scribe's Attestation: This note was scribed in the presence of Dr. Cassi Orellana MD by Slade King, SHANI.    I, Dr. Cassi Orellana, personally performed the services described in this documentation, as scribed by the above signed scribe in my presence. It is both accurate and complete to my knowledge. I agree with the details independently gathered by the clinical support staff, while the remaining scribed note accurately describes my personal service to the patient.

## 2021-03-09 ENCOUNTER — OFFICE VISIT (OUTPATIENT)
Dept: CARDIOLOGY CLINIC | Age: 71
End: 2021-03-09
Payer: MEDICARE

## 2021-03-09 VITALS
SYSTOLIC BLOOD PRESSURE: 120 MMHG | WEIGHT: 136 LBS | BODY MASS INDEX: 21.35 KG/M2 | HEIGHT: 67 IN | DIASTOLIC BLOOD PRESSURE: 64 MMHG | OXYGEN SATURATION: 97 % | HEART RATE: 100 BPM

## 2021-03-09 DIAGNOSIS — E78.2 MIXED HYPERLIPIDEMIA: ICD-10-CM

## 2021-03-09 DIAGNOSIS — I34.0 NON-RHEUMATIC MITRAL REGURGITATION: Primary | ICD-10-CM

## 2021-03-09 DIAGNOSIS — I10 ESSENTIAL HYPERTENSION: ICD-10-CM

## 2021-03-09 DIAGNOSIS — I65.23 BILATERAL CAROTID ARTERY STENOSIS: ICD-10-CM

## 2021-03-09 PROCEDURE — 99214 OFFICE O/P EST MOD 30 MIN: CPT | Performed by: INTERNAL MEDICINE

## 2021-04-02 ENCOUNTER — IMMUNIZATION (OUTPATIENT)
Dept: PRIMARY CARE CLINIC | Age: 71
End: 2021-04-02
Payer: MEDICARE

## 2021-04-02 PROCEDURE — 0011A COVID-19, MODERNA VACCINE 100MCG/0.5ML DOSE: CPT | Performed by: FAMILY MEDICINE

## 2021-04-02 PROCEDURE — 91301 COVID-19, MODERNA VACCINE 100MCG/0.5ML DOSE: CPT | Performed by: FAMILY MEDICINE

## 2021-04-30 ENCOUNTER — IMMUNIZATION (OUTPATIENT)
Dept: PRIMARY CARE CLINIC | Age: 71
End: 2021-04-30
Payer: MEDICARE

## 2021-04-30 PROCEDURE — 91301 COVID-19, MODERNA VACCINE 100MCG/0.5ML DOSE: CPT | Performed by: FAMILY MEDICINE

## 2021-04-30 PROCEDURE — 0012A COVID-19, MODERNA VACCINE 100MCG/0.5ML DOSE: CPT | Performed by: FAMILY MEDICINE

## 2021-10-11 NOTE — TELEPHONE ENCOUNTER
Received refill request for Olmesartan from [address] [place] [address].     Last ov: 03/09/2021 [facility]    Last labs: 09/25/2020 BMP    Last Refill: 09/18/2020 #90 w/ 3 refils    Next appointment: 03/09/2022 [facility] (recall)

## 2021-10-13 DIAGNOSIS — I10 ESSENTIAL HYPERTENSION: Primary | ICD-10-CM

## 2021-10-13 RX ORDER — OLMESARTAN MEDOXOMIL 5 MG/1
TABLET ORAL
Qty: 90 TABLET | Refills: 3 | Status: SHIPPED | OUTPATIENT
Start: 2021-10-13 | End: 2022-10-16

## 2022-01-13 ENCOUNTER — HOSPITAL ENCOUNTER (OUTPATIENT)
Dept: MAMMOGRAPHY | Age: 72
Discharge: HOME | End: 2022-01-18
Payer: MEDICARE

## 2022-01-13 VITALS — WEIGHT: 135 LBS | HEIGHT: 68 IN | BODY MASS INDEX: 20.46 KG/M2

## 2022-01-13 DIAGNOSIS — Z12.31 VISIT FOR SCREENING MAMMOGRAM: ICD-10-CM

## 2022-01-13 PROCEDURE — 77063 BREAST TOMOSYNTHESIS BI: CPT

## 2022-03-29 ENCOUNTER — OFFICE VISIT (OUTPATIENT)
Dept: CARDIOLOGY CLINIC | Age: 72
End: 2022-03-29
Payer: MEDICARE

## 2022-03-29 VITALS
BODY MASS INDEX: 21.52 KG/M2 | WEIGHT: 142 LBS | HEART RATE: 100 BPM | OXYGEN SATURATION: 97 % | SYSTOLIC BLOOD PRESSURE: 132 MMHG | DIASTOLIC BLOOD PRESSURE: 74 MMHG | HEIGHT: 68 IN

## 2022-03-29 DIAGNOSIS — I34.0 NONRHEUMATIC MITRAL VALVE REGURGITATION: Primary | ICD-10-CM

## 2022-03-29 DIAGNOSIS — E78.2 MIXED HYPERLIPIDEMIA: ICD-10-CM

## 2022-03-29 DIAGNOSIS — I65.23 BILATERAL CAROTID ARTERY STENOSIS: ICD-10-CM

## 2022-03-29 DIAGNOSIS — I10 ESSENTIAL HYPERTENSION: ICD-10-CM

## 2022-03-29 PROCEDURE — 99214 OFFICE O/P EST MOD 30 MIN: CPT | Performed by: NURSE PRACTITIONER

## 2022-03-29 NOTE — PROGRESS NOTES
[facility]     Outpatient Follow Up Note    CHIEF COMPLAINT / HPI:  Follow Up secondary to mitral regurgitation, carotid stenosis     Subjective:   Izabel Romeor is 80 y.o. female who presents today with a history of mitral regurgitation, HLD, HTN, Carotid stenosis. Today, Ms Jossy Hernandez says she is feeling fine. She denies any chest pain, shortness of breath, palpitations, or edema. She has had dizziness since her \"dehydration episode. \" Ms Jossy Hernandez plays the piano and organ to relax. She says she has blood work to do but wont be doing it today. With regard to medication therapy the patient has been compliant with prescribed regimen. They have tolerated therapy to date. Past Medical History:   Diagnosis Date    Diabetes mellitus ([facility] [address]     Hyperlipidemia     Hypertension      Social History:    Social History     Tobacco Use   Smoking Status Former Smoker    Packs/day: 0.50    Years: 30.00    Pack years: 15.00    Types: Cigarettes    Quit date: 3/1/2016    Years since quittin.0   Smokeless Tobacco Never Used     Current Medications:  Current Outpatient Medications   Medication Sig Dispense Refill    olmesartan (BENICAR) 5 MG tablet TAKE ONE TABLET BY MOUTH DAILY 90 tablet 3    atorvastatin (LIPITOR) 40 MG tablet Take 40 mg by mouth daily      aspirin 81 MG tablet Take 81 mg by mouth daily      metFORMIN (GLUCOPHAGE) 500 MG tablet Take 1,000 mg by mouth 2 times daily (with meals)        No current facility-administered medications for this visit. REVIEW OF SYSTEMS:    CONSTITUTIONAL: No major weight gain or loss, fatigue, weakness, night sweats or fever. HEENT: No new vision difficulties or ringing in the ears. RESPIRATORY: No new SOB, PND, orthopnea or cough. CARDIOVASCULAR: See HPI  GI: No nausea, vomiting, diarrhea, constipation, abdominal pain or changes in bowel habits. : No urinary frequency, urgency, incontinence hematuria or dysuria.   SKIN: No cyanosis or skin lesions. MUSCULOSKELETAL: No new muscle or joint pain. NEUROLOGICAL: No syncope or TIA-like symptoms. PSYCHIATRIC: No anxiety, pain, insomnia or depression    Objective:   PHYSICAL EXAM:      Wt Readings from Last 3 Encounters:   03/29/22 142 lb (64.4 kg)   01/13/22 135 lb (61.2 kg)   03/09/21 136 lb (61.7 kg)          VITALS:  BP (!) 140/78 (Site: Right Upper Arm, Position: Sitting, Cuff Size: Medium Adult)   Pulse 100   Ht 5' 8\" (1.727 m)   Wt 142 lb (64.4 kg)   SpO2 97%   BMI 21.59 kg/m²   CONSTITUTIONAL: Cooperative, no apparent distress, and appears well nourished / developed  NEUROLOGIC:  Awake and orientated to person, place and time. PSYCH: Calm affect. SKIN: Warm and dry. HEENT: Sclera non-icteric, normocephalic, neck supple, no elevation of JVP, normal carotid pulses with no bruits and thyroid normal size. LUNGS:  No increased work of breathing and clear to auscultation, no crackles or wheezing  CARDIOVASCULAR:  Regular rate and rhythm with no gallops, rubs, or abnormal heart sounds, normal PMI. The apical impulses not displaced + murmur   Heart tones are crisp and normal  Cervical veins are not engorged  The carotid upstroke is normal in amplitude and contour without delay + L and R Bruit  JVP is not elevated  ABDOMEN:  Normal bowel sounds, non-distended and non-tender to palpation  EXT: No edema, no calf tenderness. Pulses are present bilaterally.     DATA:    Lab Results   Component Value Date    ALT 9 (L) 09/18/2020    AST 14 (L) 09/18/2020    ALKPHOS 75 09/18/2020    BILITOT 0.3 09/18/2020     Lab Results   Component Value Date    CREATININE 0.8 09/25/2020    BUN 16 09/25/2020     09/25/2020    K 4.9 09/25/2020     09/25/2020    CO2 23 09/25/2020     Lab Results   Component Value Date    TSH 0.03 (L) 01/26/2016    P9MQTBL 1.00 04/16/2019     Lab Results   Component Value Date    WBC 7.0 04/16/2019    HGB 12.6 04/16/2019    HCT 38.9 04/16/2019    MCV 98.1 04/16/2019     2019     No components found for: CHLPL  Lab Results   Component Value Date    TRIG 130 2020    TRIG 99 09/15/2018    TRIG 113 2017     Lab Results   Component Value Date    HDL 48 2020    HDL 52 09/15/2018    HDL 44 (L) 2017     Lab Results   Component Value Date    LDLCALC 65 2020    LDLCALC 81 09/15/2018    LDLCALC 63 2017     Lab Results   Component Value Date    LABVLDL 26 2020      No results found for: BNP  Radiology Review:  Pertinent images / reports were reviewed as a part of this visit and reveals the following:    Last Echo: 20  Summary   Left ventricular systolic function is normal with ejection fraction   estimated at 60-65 %. No regional wall motion abnormalities are noted. Normal left ventricular wall thickness. Grade II diastolic dysfunction with elevated filling pressure. The left atrium is severely dilated. Mitral annular calcification is present. Limited motion of the posterior leaflet. Severe mitral regurgitation. Mild mitral stenosis is present. Mild pulmonic regurgitation present. Mild-to-moderate tricuspid regurgitation. Systolic pulmonary artery pressure (SPAP) is normal and estimated at 32 mmHg   (right atrial pressure 3 mmHg). IVC is normal in size (< 2.1 cm) and collapses > 50% with respiration   consistent with normal RA pressure (3 mmHg). Previous echo done 2019 - EF 60%    Carotid Duplex 2020   Summary        There is <50% stenosis of the right internal carotid arteries.    There is 50-69% stenosis of the left internal carotid arteries. Last Stress Test: 2016   Summary    Normal myocardial perfusion study.    Normal LV function. Last EC2020  Sinus  Rhythm   -Left atrial enlargement. Assessment:      Diagnosis Orders   1. Nonrheumatic mitral valve regurgitation   ~ Echo 2020 EF 60-65%, Grade II DD, severe MR, mild mitral stenosis. mild to moderate tricuspid regurgitation  ~ refused DORINA/surgical intervention  Echo 2D w doppler w color complete   2. Essential hypertension   ~ controlled; 132/74 on second check     3. Mixed hyperlipidemia   ~ atorvastatin 40  ~ no recent labs, refuses to get any drawn today    4. Bilateral carotid artery stenosis   ~ carotid duplex 11/20/20 <49% SHRUTI, 96-35% LICA VL DUP CAROTID BILATERAL     I had the opportunity to review the clinical symptoms and presentation of Cyndie Poster. Plan:     1. Recheck echo and carotid duplex   2. Recommended blood work but she refused and said she might get blood work with PCP   3. 1 year with Dr Alessandra Thompson     Overall the patient is stable from CV standpoint    I have addresed the patient's cardiac risk factors and adjusted pharmacologic treatment as needed. In addition, I have reinforced the need for patient directed risk factor modification. Further evaluation will be based upon the patient's clinical course and testing results. All questions and concerns were addressed to the patient    The patient is not currently smoking. The risks related to smoking were reviewed with the patient. Recommend maintaining a smoke-free lifestyle. Patient is not on a beta-blocker; no MI  Patient is on an ace-i/ARB  Patient is on a statin    Dual Antiplatelet therapy / anti-coagulation has not been recommended / prescribed for this patient. Education conducted on adverse reactions including bleeding was discussed. The patient verbalizes understanding not to stop medications without discussing with us. Discussed exercise: 30-60 minutes 7 days/week  Discussed Low saturated fat/COLE diet. Thank you for allowing to us to participate in the care of Cyndie Poster.     Electronically signed by BRAYDEN Bustos CNP on 3/29/2022 at 6:19 AM     Documentation of today's visit sent to PCP

## 2022-04-15 ENCOUNTER — HOSPITAL ENCOUNTER (OUTPATIENT)
Dept: NON INVASIVE DIAGNOSTICS | Age: 72
Discharge: HOME | End: 2022-04-15
Payer: MEDICARE

## 2022-04-15 ENCOUNTER — HOSPITAL ENCOUNTER (OUTPATIENT)
Dept: VASCULAR LAB | Age: 72
Discharge: HOME | End: 2022-04-15
Payer: MEDICARE

## 2022-04-15 DIAGNOSIS — I65.23 BILATERAL CAROTID ARTERY STENOSIS: ICD-10-CM

## 2022-04-15 DIAGNOSIS — I34.0 NONRHEUMATIC MITRAL VALVE REGURGITATION: ICD-10-CM

## 2022-04-15 PROCEDURE — 93880 EXTRACRANIAL BILAT STUDY: CPT

## 2022-04-17 DIAGNOSIS — I65.23 CAROTID STENOSIS, ASYMPTOMATIC, BILATERAL: Primary | ICD-10-CM

## 2022-04-18 ENCOUNTER — TELEPHONE (OUTPATIENT)
Dept: CARDIOLOGY CLINIC | Age: 72
End: 2022-04-18

## 2022-04-18 NOTE — TELEPHONE ENCOUNTER
----- Message from BRAYDEN Lindo CNP sent at 4/17/2022  8:40 PM EDT -----  Moderate stenosis in left and right carotid. Repeat study in 6 months.  I will place order in her chart so she can call and schedule

## 2022-04-21 NOTE — TELEPHONE ENCOUNTER
Call placed to patient who was not available for message below. Lmor to call the office. Will send out a letter of contact.

## 2022-04-29 ENCOUNTER — HOSPITAL ENCOUNTER (OUTPATIENT)
Dept: VASCULAR LAB | Age: 72
Discharge: HOME | End: 2022-04-29
Payer: MEDICARE

## 2022-04-29 DIAGNOSIS — I73.89 ACROCYANOSIS (HCC): ICD-10-CM

## 2022-04-29 PROCEDURE — 93922 UPR/L XTREMITY ART 2 LEVELS: CPT

## 2022-10-16 DIAGNOSIS — I10 ESSENTIAL HYPERTENSION: Primary | ICD-10-CM

## 2022-10-16 RX ORDER — OLMESARTAN MEDOXOMIL 5 MG/1
TABLET ORAL
Qty: 30 TABLET | Refills: 0 | Status: SHIPPED | OUTPATIENT
Start: 2022-10-16 | End: 2022-11-01 | Stop reason: SDUPTHER

## 2022-10-18 NOTE — TELEPHONE ENCOUNTER
Left message on machine that she needs labs and an order is in her chart to go to [facility].

## 2022-10-19 ENCOUNTER — TELEPHONE (OUTPATIENT)
Dept: CARDIOLOGY CLINIC | Age: 72
End: 2022-10-19

## 2022-10-19 NOTE — TELEPHONE ENCOUNTER
Pt is asking for her lab orders to be mailed to her home address. Also would like a copy faxed to [email or web address] B[facility]al Chavarria at fax number [phone].

## 2022-10-25 ENCOUNTER — TELEPHONE (OUTPATIENT)
Dept: CARDIOLOGY CLINIC | Age: 72
End: 2022-10-25

## 2022-10-25 NOTE — TELEPHONE ENCOUNTER
Refill request from [facility] for a 90 day refill. Pt needs labs. Looks like [facility] orders were mailed to pt.

## 2022-10-28 NOTE — TELEPHONE ENCOUNTER
Pt states she had labs done a [facility] in [address] [facility] on [address].      Asking for script for Olmesartan 5 mg to be sent to   [place] [address] [id number] [address], [address] [facility] [address] [phone] [address] [facility], [address] [facility]   Phone:  [phone]  Fax:  [phone]

## 2022-11-01 RX ORDER — OLMESARTAN MEDOXOMIL 5 MG/1
TABLET ORAL
Qty: 30 TABLET | Refills: 0 | Status: SHIPPED | OUTPATIENT
Start: 2022-11-01 | End: 2022-11-18

## 2022-11-02 NOTE — TELEPHONE ENCOUNTER
Pt stated that she had the labs drawn last Thursday at [facility]. Will check for results in FF tomorrow.

## 2022-11-18 RX ORDER — OLMESARTAN MEDOXOMIL 5 MG/1
TABLET ORAL
Qty: 90 TABLET | Refills: 3 | Status: SHIPPED | OUTPATIENT
Start: 2022-11-18

## 2022-12-17 ENCOUNTER — HOSPITAL ENCOUNTER (OUTPATIENT)
Dept: CT IMAGING | Age: 72
Discharge: HOME | End: 2022-12-17
Payer: MEDICARE

## 2022-12-17 DIAGNOSIS — G44.52 NEW DAILY PERSISTENT HEADACHE: ICD-10-CM

## 2022-12-17 LAB
GFR SERPL CREATININE-BSD FRML MDRD: 54 ML/MIN/{1.73_M2}
PERFORMED ON: ABNORMAL
POC CREATININE: 1.1 MG/DL (ref 0.6–1.2)
POC SAMPLE TYPE: ABNORMAL

## 2022-12-17 PROCEDURE — 70470 CT HEAD/BRAIN W/O & W/DYE: CPT

## 2022-12-17 PROCEDURE — 6360000004 HC RX CONTRAST MEDICATION: Performed by: HOSPITALIST

## 2022-12-17 PROCEDURE — 82565 ASSAY OF CREATININE: CPT

## 2022-12-17 RX ADMIN — IOPAMIDOL 75 ML: 755 INJECTION, SOLUTION INTRAVENOUS at 13:22

## 2023-01-09 ENCOUNTER — HOSPITAL ENCOUNTER (EMERGENCY)
Age: 73
Discharge: HOME | End: 2023-01-10
Attending: EMERGENCY MEDICINE
Payer: MEDICARE

## 2023-01-09 ENCOUNTER — APPOINTMENT (OUTPATIENT)
Dept: GENERAL RADIOLOGY | Age: 73
End: 2023-01-09
Payer: MEDICARE

## 2023-01-09 VITALS
RESPIRATION RATE: 16 BRPM | WEIGHT: 118.17 LBS | DIASTOLIC BLOOD PRESSURE: 77 MMHG | TEMPERATURE: 97.5 F | HEART RATE: 98 BPM | BODY MASS INDEX: 18.55 KG/M2 | HEIGHT: 67 IN | SYSTOLIC BLOOD PRESSURE: 155 MMHG | OXYGEN SATURATION: 99 %

## 2023-01-09 DIAGNOSIS — R62.7 ADULT FAILURE TO THRIVE: Primary | ICD-10-CM

## 2023-01-09 LAB
A/G RATIO: 1.2 (ref 1.1–2.2)
ALBUMIN SERPL-MCNC: 3.4 G/DL (ref 3.4–5)
ALP BLD-CCNC: 75 U/L (ref 40–129)
ALT SERPL-CCNC: 13 U/L (ref 10–40)
ANION GAP SERPL CALCULATED.3IONS-SCNC: 13 MMOL/L (ref 3–16)
AST SERPL-CCNC: 15 U/L (ref 15–37)
BASOPHILS ABSOLUTE: 0.1 K/UL (ref 0–0.2)
BASOPHILS RELATIVE PERCENT: 0.8 %
BILIRUB SERPL-MCNC: 0.4 MG/DL (ref 0–1)
BUN BLDV-MCNC: 26 MG/DL (ref 7–20)
CALCIUM SERPL-MCNC: 9.9 MG/DL (ref 8.3–10.6)
CHLORIDE BLD-SCNC: 100 MMOL/L (ref 99–110)
CO2: 25 MMOL/L (ref 21–32)
CREAT SERPL-MCNC: 0.8 MG/DL (ref 0.6–1.2)
EOSINOPHILS ABSOLUTE: 0.1 K/UL (ref 0–0.6)
EOSINOPHILS RELATIVE PERCENT: 1.1 %
GFR SERPL CREATININE-BSD FRML MDRD: >60 ML/MIN/{1.73_M2}
GLUCOSE BLD-MCNC: 115 MG/DL (ref 70–99)
HCT VFR BLD CALC: 36.1 % (ref 36–48)
HEMOGLOBIN: 11.7 G/DL (ref 12–16)
LIPASE: 21 U/L (ref 13–60)
LYMPHOCYTES ABSOLUTE: 1.4 K/UL (ref 1–5.1)
LYMPHOCYTES RELATIVE PERCENT: 22.9 %
MCH RBC QN AUTO: 32 PG (ref 26–34)
MCHC RBC AUTO-ENTMCNC: 32.4 G/DL (ref 31–36)
MCV RBC AUTO: 98.9 FL (ref 80–100)
MONOCYTES ABSOLUTE: 0.7 K/UL (ref 0–1.3)
MONOCYTES RELATIVE PERCENT: 10.4 %
NEUTROPHILS ABSOLUTE: 4.1 K/UL (ref 1.7–7.7)
NEUTROPHILS RELATIVE PERCENT: 64.8 %
PDW BLD-RTO: 15.3 % (ref 12.4–15.4)
PLATELET # BLD: 277 K/UL (ref 135–450)
PMV BLD AUTO: 9.3 FL (ref 5–10.5)
POTASSIUM REFLEX MAGNESIUM: 4 MMOL/L (ref 3.5–5.1)
RAPID INFLUENZA  B AGN: NEGATIVE
RAPID INFLUENZA A AGN: NEGATIVE
RBC # BLD: 3.65 M/UL (ref 4–5.2)
SARS-COV-2, NAAT: NOT DETECTED
SODIUM BLD-SCNC: 138 MMOL/L (ref 136–145)
TOTAL PROTEIN: 6.3 G/DL (ref 6.4–8.2)
TROPONIN: <0.01 NG/ML
WBC # BLD: 6.3 K/UL (ref 4–11)

## 2023-01-09 PROCEDURE — 87635 SARS-COV-2 COVID-19 AMP PRB: CPT

## 2023-01-09 PROCEDURE — 2580000003 HC RX 258: Performed by: GENERAL ACUTE CARE HOSPITAL

## 2023-01-09 PROCEDURE — 99285 EMERGENCY DEPT VISIT HI MDM: CPT

## 2023-01-09 PROCEDURE — 83690 ASSAY OF LIPASE: CPT

## 2023-01-09 PROCEDURE — 80053 COMPREHEN METABOLIC PANEL: CPT

## 2023-01-09 PROCEDURE — 71046 X-RAY EXAM CHEST 2 VIEWS: CPT

## 2023-01-09 PROCEDURE — 36415 COLL VENOUS BLD VENIPUNCTURE: CPT

## 2023-01-09 PROCEDURE — 87804 INFLUENZA ASSAY W/OPTIC: CPT

## 2023-01-09 PROCEDURE — 85025 COMPLETE CBC W/AUTO DIFF WBC: CPT

## 2023-01-09 PROCEDURE — 93005 ELECTROCARDIOGRAM TRACING: CPT | Performed by: GENERAL ACUTE CARE HOSPITAL

## 2023-01-09 PROCEDURE — 84484 ASSAY OF TROPONIN QUANT: CPT

## 2023-01-09 RX ORDER — 0.9 % SODIUM CHLORIDE 0.9 %
1000 INTRAVENOUS SOLUTION INTRAVENOUS ONCE
Status: COMPLETED | OUTPATIENT
Start: 2023-01-09 | End: 2023-01-10

## 2023-01-09 RX ADMIN — SODIUM CHLORIDE 1000 ML: 9 INJECTION, SOLUTION INTRAVENOUS at 23:44

## 2023-01-10 LAB
EKG ATRIAL RATE: 108 BPM
EKG DIAGNOSIS: NORMAL
EKG P AXIS: 81 DEGREES
EKG P-R INTERVAL: 160 MS
EKG Q-T INTERVAL: 332 MS
EKG QRS DURATION: 74 MS
EKG QTC CALCULATION (BAZETT): 444 MS
EKG R AXIS: 98 DEGREES
EKG T AXIS: 64 DEGREES
EKG VENTRICULAR RATE: 108 BPM

## 2023-01-10 PROCEDURE — 93010 ELECTROCARDIOGRAM REPORT: CPT | Performed by: INTERNAL MEDICINE

## 2023-01-10 NOTE — ED NOTES
D/C: Order noted for d/c. Pt confirmed d/c paperwork  have correct name. Discharge and education instructions reviewed with patient. Teach-back successful. Pt verbalized understanding and signed d/c papers. Pt denied questions at this time. No acute distress noted. Patient instructed to follow-up as noted - return to emergency department if symptoms worsen. Patient verbalized understanding. Discharged per EDMD with discharge instructions. Pt discharged to private vehicle. Patient stable upon departure. Thanked patient for choosing [facility]) for care.           Sandy Chen RN  01/10/23 0939

## 2023-01-10 NOTE — ED PROVIDER NOTES
[address] [facility]        Pt Name: Zara Patel  MRN: [id number]  [place] 1950  Date of evaluation: 2023  Provider: BRAYDEN Wakefield CNP  PCP: Gary Worthy MD  Note Started: 1:13 AM EST 1/10/23      {Shared Not Shared:80083}      CHIEF COMPLAINT       Chief Complaint   Patient presents with    Dehydration     Pt states that she has had no appetite for the \"last 3 months\". Pt states that food \"doesn't taste good\". Pt alert and oriented with no signs of distress noted. Pt denies pain. HISTORY OF PRESENT ILLNESS: 1 or more Elements     History From: ***  {Limitations to history (Optional):61072}    Zara Patel is a 71 y.o. female who presents ***    Nursing Notes were all reviewed and agreed with or any disagreements were addressed in the HPI. REVIEW OF SYSTEMS :      Review of Systems    Positives and Pertinent negatives as per HPI. SURGICAL HISTORY   History reviewed. No pertinent surgical history. CURRENTMEDICATIONS       Previous Medications    ASPIRIN 81 MG TABLET    Take 81 mg by mouth daily    ATORVASTATIN (LIPITOR) 40 MG TABLET    Take 40 mg by mouth daily    METFORMIN (GLUCOPHAGE) 500 MG TABLET    Take 1,000 mg by mouth 2 times daily (with meals)     OLMESARTAN (BENICAR) 5 MG TABLET    TAKE ONE TABLET BY MOUTH DAILY       ALLERGIES     Patient has no known allergies.     FAMILYHISTORY       Family History   Problem Relation Age of Onset    Diabetes Mother     High Blood Pressure Mother         SOCIAL HISTORY       Social History     Tobacco Use    Smoking status: Former     Packs/day: 0.50     Years: 30.00     Pack years: 15.00     Types: Cigarettes     Quit date: 3/1/2016     Years since quittin.8    Smokeless tobacco: Never   Vaping Use    Vaping Use: Never used   Substance Use Topics    Alcohol use: No    Drug use: No       SCREENINGS   NIH Stroke Scale  Interval: Baseline  Level of Consciousness (1a): Alert  LOC Questions (1b): Answers both correctly  LOC Commands (1c): Performs both tasks correctly  Best Gaze (2): Normal  Visual (3): No visual loss  Facial Palsy (4): Normal symmetrical movement  Motor Arm, Left (5a): No drift  Motor Arm, Right (5b): No drift  Motor Leg, Left (6a): No drift  Motor Leg, Right (6b): No drift  Limb Ataxia (7): Absent  Sensory (8): Normal  Best Language (9): No aphasia  Dysarthria (10): Normal  Extinction and Inattention (11): No abnormality  Total: 0    [place] Coma Scale  Eye Opening: Spontaneous  Best Verbal Response: Oriented  Best Motor Response: Obeys commands  [place] Coma Scale Score: 15                CIWA Assessment  BP: (!) 155/77  Heart Rate: 98           PHYSICAL EXAM  1 or more Elements     ED Triage Vitals [01/09/23 1926]   BP Temp Temp Source Heart Rate Resp SpO2 Height Weight   (!) 155/77 97.5 °F (36.4 °C) Oral (!) 119 15 98 % 5' 7\" (1.702 m) 118 lb 2.7 oz (53.6 kg)       Physical Exam    ***    DIAGNOSTIC RESULTS   LABS:    Labs Reviewed   CBC WITH AUTO DIFFERENTIAL - Abnormal; Notable for the following components:       Result Value    RBC 3.65 (*)     Hemoglobin 11.7 (*)     All other components within normal limits   COMPREHENSIVE METABOLIC PANEL W/ REFLEX TO MG FOR LOW K - Abnormal; Notable for the following components:    Glucose 115 (*)     BUN 26 (*)     Total Protein 6.3 (*)     All other components within normal limits   COVID-19, RAPID   RAPID INFLUENZA A/B ANTIGENS   LIPASE   TROPONIN   URINALYSIS WITH REFLEX TO CULTURE       When ordered only abnormal lab results are displayed. All other labs were within normal range or not returned as of this dictation. EKG: When ordered, EKG's are interpreted by the Emergency Department Physician in the absence of a cardiologist.  Please see their note for interpretation of EKG.     RADIOLOGY:   Non-plain film images such as CT, Ultrasound and MRI are read by the radiologist. Plain radiographic images are visualized and preliminarily interpreted by the ED Provider with the below findings:    ***    Interpretation per the Radiologist below, if available at the time of this note:    XR CHEST (2 VW)   Final Result   No acute process. XR CHEST (2 VW)    Result Date: 1/9/2023  EXAMINATION: TWO XRAY VIEWS OF THE CHEST 1/9/2023 8:11 pm COMPARISON: 01/26/2016 HISTORY: ORDERING SYSTEM PROVIDED HISTORY: gen weakness TECHNOLOGIST PROVIDED HISTORY: Reason for exam:->gen weakness Reason for Exam: gen weakness FINDINGS: The lungs are without acute focal process. There is no effusion or pneumothorax. The cardiomediastinal silhouette is stable. The osseous structures are stable. No acute process. No results found. PROCEDURES   Unless otherwise noted below, none     Procedures    CRITICAL CARE TIME (.cctime)   ***    PAST MEDICAL HISTORY      has a past medical history of Diabetes mellitus ([address] Hyperlipidemia, and Hypertension. EMERGENCY DEPARTMENT COURSE and DIFFERENTIAL DIAGNOSIS/MDM:   Vitals:    Vitals:    01/09/23 1926 01/09/23 1928   BP: (!) 155/77    Pulse: (!) 119 98   Resp: 15 16   Temp: 97.5 °F (36.4 °C)    TempSrc: Oral    SpO2: 98% 99%   Weight: 118 lb 2.7 oz (53.6 kg)    Height: 5' 7\" (1.702 m)        Patient was given the following medications:  Medications   0.9 % sodium chloride bolus (1,000 mLs IntraVENous New Bag 1/9/23 7394)             Is this patient to be included in the SEP-1 Core Measure due to severe sepsis or septic shock? {[id number]:69237}    Chronic Conditions affecting care: ***   has a past medical history of Diabetes mellitus ([address] Hyperlipidemia, and Hypertension.     CONSULTS: (Who and What was discussed)  None  ***    {Social Determinants (Optional):36750::\"None\"}    Records Reviewed (Source): ***    CC/HPI Summary, DDx, ED Course, and Reassessment: ***        01:30-end of shift handoff given to ED attending  [facility] [place] who also performed face-to-face evaluation and agrees to follow-up on urinalysis results and make final disposition accordingly. Disposition Considerations (tests considered but not done, Admit vs D/C, Shared Decision Making, Pt Expectation of Test or Tx.): ***       I am the Primary Clinician of Record. FINAL IMPRESSION      1.  Adult failure to thrive          DISPOSITION/PLAN     DISPOSITION Discharge - Pending Orders Complete 01/10/2023 01:15:59 AM      PATIENT REFERRED TO:  Nicole Ibarra MD  [address] [place] [address] Drive  [address], [address] [place] [address]  [address] [place] [address] [address] [facility]          DISCHARGE MEDICATIONS:  New Prescriptions    No medications on file       DISCONTINUED MEDICATIONS:  Discontinued Medications    No medications on file              (Please note that portions of this note were completed with a voice recognition program.  Efforts were made to edit the dictations but occasionally words are mis-transcribed.)    BRAYDEN Wakefield CNP (electronically signed) has a past medical history of Diabetes mellitus ([facility] [address] Hyperlipidemia, and Hypertension. EMERGENCY DEPARTMENT COURSE and DIFFERENTIAL DIAGNOSIS/MDM:   Vitals:    Vitals:    01/09/23 1926 01/09/23 1928   BP: (!) 155/77    Pulse: (!) 119 98   Resp: 15 16   Temp: 97.5 °F (36.4 °C)    TempSrc: Oral    SpO2: 98% 99%   Weight: 118 lb 2.7 oz (53.6 kg)    Height: 5' 7\" (1.702 m)        Patient was given the following medications:  Medications   0.9 % sodium chloride bolus (1,000 mLs IntraVENous New Bag 1/9/23 6261)             Is this patient to be included in the SEP-1 Core Measure due to severe sepsis or septic shock? No   Exclusion criteria - the patient is NOT to be included for SEP-1 Core Measure due to: Infection is not suspected    Chronic Conditions affecting care:    has a past medical history of Diabetes mellitus ([facility] [address] [facility] Hyperlipidemia, and Hypertension. CONSULTS: (Who and What was discussed)  None          Records Reviewed (Source): Previous records reviewed in order to gain further information regarding patient's PMH as well as her HPI. Nursing notes reviewed. CC/HPI Summary, DDx, ED Course, and Reassessment: This is a 71-year-old -American female with history of diabetes, hypertension, and hyperlipidemia who presents to the emergency department today for evaluation of progressive fatigue, generalized weakness, and poor appetite. Patient is accompanied by her daughter who insisted she come to the ED. There has been no recent travel or known sick contact. There is no history of any known cancers. Patient denies chest pain or trouble breathing. Patient states \"I just have no energy\". There have been no recent medication changes. Physical exam complete. Patient arrives nontoxic and afebrile. She is mildly hypertensive and tachycardic. Affect is flat and mood is depressed.   Her abdomen is soft and nonsurgical.  EKG is interpreted by ED physician reviewed by myself is negative for acute ST elevation. Chest x-ray interpreted by radiologist and reviewed by myself is negative for pneumothorax or infiltrate. Patient is medicated with IV 0.9% normal saline bolus. Laboratory studies are pending. 01:30-end of shift handoff given to ED attending Dr. Maria Guadalupe Henderson who also performed face-to-face evaluation and agrees to follow-up on urinalysis results and make final disposition accordingly. I am the Primary Clinician of Record. FINAL IMPRESSION      1.  Adult failure to thrive          DISPOSITION/PLAN     DISPOSITION Discharge - Pending Orders Complete 01/10/2023 01:15:59 AM      PATIENT REFERRED TO:  Mallorie Mattson MD  [address]  [address], [address] [place] [address]  [address] [place] [address]          DISCHARGE MEDICATIONS:  New Prescriptions    No medications on file       DISCONTINUED MEDICATIONS:  Discontinued Medications    No medications on file              (Please note that portions of this note were completed with a voice recognition program.  Efforts were made to edit the dictations but occasionally words are mis-transcribed.)    BRAYDEN Starr CNP (electronically signed)        BRAYDEN Starr CNP  01/17/23 5104

## 2023-01-10 NOTE — ED NOTES
Pt states she does not titus to be straight cathed. CORNELL Akhtar notified.      Sandy Chen RN  01/10/23 0150

## 2023-01-10 NOTE — DISCHARGE INSTRUCTIONS
Increase your fluid intake and attempt to eat small frequent meals. You may also drink supplement shakes such as Boost/Ensure. You have been given an urgent GI referral.  You should receive a phone call within the next 2 to 3 days for help with establishing a follow-up appointment. You have also been given a neurology referral.  Follow-up as directed. You should be reevaluated by your primary care physician by the end of the week. Return for high fever, incessant vomiting, severe pain, any other worsening symptoms.

## 2023-01-10 NOTE — ED PROVIDER NOTES
I was available for consultation during patient's ED stay. Patient was cared for by IMHAI. I did not evaluate or participate in patient care. EKG Interpretation    Interpreted by emergency department physician    Rhythm: sinus tachycardia  Rate: 100-110  Axis: right  Ectopy: none  Conduction: normal  ST Segments: normal  T Waves: normal  Q Waves: none    Clinical Impression: Sinus tachycardia with rightward axis. Slightly different from previous EKG dated September 18, 2020. She does not have rightward axis at that time. Otherwise ST and T waves similar to previous EKG. Normal QRS duration. Normal QT QTC. Normal LA interval.  Interpreted by myself.     MD Ronald Richardson MD  01/09/23 9052

## 2023-01-10 NOTE — ED NOTES
Pt attempted to use the restroom again for urine sample, still unable to go, does not want to be straight cathed still. MD Asael Islas notified.       Sandy Thomas RN  01/10/23 7636

## 2023-01-17 ENCOUNTER — HOSPITAL ENCOUNTER (OUTPATIENT)
Dept: MAMMOGRAPHY | Age: 73
Discharge: HOME | End: 2023-01-22

## 2023-01-17 DIAGNOSIS — Z12.31 VISIT FOR SCREENING MAMMOGRAM: ICD-10-CM

## 2023-01-17 ASSESSMENT — ENCOUNTER SYMPTOMS
ABDOMINAL PAIN: 0
SORE THROAT: 0
COUGH: 0
BACK PAIN: 0
CHEST TIGHTNESS: 0
VOMITING: 0
NAUSEA: 1
SHORTNESS OF BREATH: 0
VOICE CHANGE: 0
WHEEZING: 0